# Patient Record
Sex: MALE | Race: WHITE | NOT HISPANIC OR LATINO | Employment: FULL TIME | URBAN - METROPOLITAN AREA
[De-identification: names, ages, dates, MRNs, and addresses within clinical notes are randomized per-mention and may not be internally consistent; named-entity substitution may affect disease eponyms.]

---

## 2019-08-23 ENCOUNTER — TELEPHONE (OUTPATIENT)
Dept: GASTROENTEROLOGY | Facility: CLINIC | Age: 49
End: 2019-08-23

## 2019-08-23 ENCOUNTER — PREP FOR SURGERY (OUTPATIENT)
Dept: OTHER | Facility: HOSPITAL | Age: 49
End: 2019-08-23

## 2019-08-23 DIAGNOSIS — K62.5 RECTAL BLEEDING: Primary | ICD-10-CM

## 2019-08-23 NOTE — TELEPHONE ENCOUNTER
Please schedule Dr. Abe Brooks to have a colonoscopy by me in the next two weeks to evaluate rectal bleeding. thx.

## 2019-08-28 PROBLEM — K62.5 RECTAL BLEEDING: Status: ACTIVE | Noted: 2019-08-28

## 2019-09-06 ENCOUNTER — ANESTHESIA (OUTPATIENT)
Dept: GASTROENTEROLOGY | Facility: HOSPITAL | Age: 49
End: 2019-09-06

## 2019-09-06 ENCOUNTER — ANESTHESIA EVENT (OUTPATIENT)
Dept: GASTROENTEROLOGY | Facility: HOSPITAL | Age: 49
End: 2019-09-06

## 2019-09-06 ENCOUNTER — HOSPITAL ENCOUNTER (OUTPATIENT)
Facility: HOSPITAL | Age: 49
Setting detail: HOSPITAL OUTPATIENT SURGERY
Discharge: HOME | End: 2019-09-06
Attending: INTERNAL MEDICINE | Admitting: INTERNAL MEDICINE

## 2019-09-06 VITALS
HEART RATE: 54 BPM | TEMPERATURE: 98.2 F | HEIGHT: 66 IN | BODY MASS INDEX: 22.68 KG/M2 | DIASTOLIC BLOOD PRESSURE: 69 MMHG | WEIGHT: 141.09 LBS | RESPIRATION RATE: 14 BRPM | SYSTOLIC BLOOD PRESSURE: 107 MMHG | OXYGEN SATURATION: 50 %

## 2019-09-06 DIAGNOSIS — K62.5 RECTAL BLEEDING: ICD-10-CM

## 2019-09-06 LAB
ALBUMIN SERPL-MCNC: 3.9 G/DL (ref 3.5–5.2)
ALBUMIN/GLOB SERPL: 1.4 G/DL
ALP SERPL-CCNC: 52 U/L (ref 39–117)
ALT SERPL W P-5'-P-CCNC: 10 U/L (ref 1–41)
ANION GAP SERPL CALCULATED.3IONS-SCNC: 10.9 MMOL/L (ref 5–15)
AST SERPL-CCNC: 19 U/L (ref 1–40)
BASOPHILS # BLD AUTO: 0.02 10*3/MM3 (ref 0–0.2)
BASOPHILS NFR BLD AUTO: 0.3 % (ref 0–1.5)
BILIRUB SERPL-MCNC: 0.4 MG/DL (ref 0.2–1.2)
BUN BLD-MCNC: 11 MG/DL (ref 6–20)
BUN/CREAT SERPL: 11.7 (ref 7–25)
CALCIUM SPEC-SCNC: 8.7 MG/DL (ref 8.6–10.5)
CHLORIDE SERPL-SCNC: 101 MMOL/L (ref 98–107)
CO2 SERPL-SCNC: 28.1 MMOL/L (ref 22–29)
CREAT BLD-MCNC: 0.94 MG/DL (ref 0.76–1.27)
CRP SERPL-MCNC: 0.57 MG/DL (ref 0–0.5)
DEPRECATED RDW RBC AUTO: 41.1 FL (ref 37–54)
EOSINOPHIL # BLD AUTO: 0.11 10*3/MM3 (ref 0–0.4)
EOSINOPHIL NFR BLD AUTO: 1.9 % (ref 0.3–6.2)
ERYTHROCYTE [DISTWIDTH] IN BLOOD BY AUTOMATED COUNT: 12.2 % (ref 12.3–15.4)
ERYTHROCYTE [SEDIMENTATION RATE] IN BLOOD: 15 MM/HR (ref 0–15)
GFR SERPL CREATININE-BSD FRML MDRD: 86 ML/MIN/1.73
GLOBULIN UR ELPH-MCNC: 2.7 GM/DL
GLUCOSE BLD-MCNC: 67 MG/DL (ref 65–99)
HCT VFR BLD AUTO: 37.1 % (ref 37.5–51)
HGB BLD-MCNC: 12.2 G/DL (ref 13–17.7)
IMM GRANULOCYTES # BLD AUTO: 0.02 10*3/MM3 (ref 0–0.05)
IMM GRANULOCYTES NFR BLD AUTO: 0.3 % (ref 0–0.5)
LYMPHOCYTES # BLD AUTO: 1.21 10*3/MM3 (ref 0.7–3.1)
LYMPHOCYTES NFR BLD AUTO: 20.8 % (ref 19.6–45.3)
MCH RBC QN AUTO: 30 PG (ref 26.6–33)
MCHC RBC AUTO-ENTMCNC: 32.9 G/DL (ref 31.5–35.7)
MCV RBC AUTO: 91.4 FL (ref 79–97)
MONOCYTES # BLD AUTO: 0.5 10*3/MM3 (ref 0.1–0.9)
MONOCYTES NFR BLD AUTO: 8.6 % (ref 5–12)
NEUTROPHILS # BLD AUTO: 3.95 10*3/MM3 (ref 1.7–7)
NEUTROPHILS NFR BLD AUTO: 68.1 % (ref 42.7–76)
NRBC BLD AUTO-RTO: 0 /100 WBC (ref 0–0.2)
PLATELET # BLD AUTO: 218 10*3/MM3 (ref 140–450)
PMV BLD AUTO: 9.6 FL (ref 6–12)
POTASSIUM BLD-SCNC: 4.3 MMOL/L (ref 3.5–5.2)
PROT SERPL-MCNC: 6.6 G/DL (ref 6–8.5)
RBC # BLD AUTO: 4.06 10*6/MM3 (ref 4.14–5.8)
SODIUM BLD-SCNC: 140 MMOL/L (ref 136–145)
WBC NRBC COR # BLD: 5.81 10*3/MM3 (ref 3.4–10.8)

## 2019-09-06 PROCEDURE — 86140 C-REACTIVE PROTEIN: CPT | Performed by: INTERNAL MEDICINE

## 2019-09-06 PROCEDURE — 88305 TISSUE EXAM BY PATHOLOGIST: CPT | Performed by: INTERNAL MEDICINE

## 2019-09-06 PROCEDURE — 25010000002 PROPOFOL 10 MG/ML EMULSION: Performed by: ANESTHESIOLOGY

## 2019-09-06 PROCEDURE — 85652 RBC SED RATE AUTOMATED: CPT | Performed by: INTERNAL MEDICINE

## 2019-09-06 PROCEDURE — 80053 COMPREHEN METABOLIC PANEL: CPT | Performed by: INTERNAL MEDICINE

## 2019-09-06 PROCEDURE — 85025 COMPLETE CBC W/AUTO DIFF WBC: CPT | Performed by: INTERNAL MEDICINE

## 2019-09-06 PROCEDURE — 45380 COLONOSCOPY AND BIOPSY: CPT | Performed by: INTERNAL MEDICINE

## 2019-09-06 RX ORDER — PROPOFOL 10 MG/ML
VIAL (ML) INTRAVENOUS AS NEEDED
Status: DISCONTINUED | OUTPATIENT
Start: 2019-09-06 | End: 2019-09-06 | Stop reason: SURG

## 2019-09-06 RX ORDER — SODIUM CHLORIDE 0.9 % (FLUSH) 0.9 %
10 SYRINGE (ML) INJECTION AS NEEDED
Status: DISCONTINUED | OUTPATIENT
Start: 2019-09-06 | End: 2019-09-06 | Stop reason: HOSPADM

## 2019-09-06 RX ORDER — LIDOCAINE HYDROCHLORIDE 10 MG/ML
0.5 INJECTION, SOLUTION INFILTRATION; PERINEURAL ONCE AS NEEDED
Status: DISCONTINUED | OUTPATIENT
Start: 2019-09-06 | End: 2019-09-06 | Stop reason: HOSPADM

## 2019-09-06 RX ORDER — CETIRIZINE HYDROCHLORIDE 10 MG/1
10 TABLET ORAL DAILY
COMMUNITY

## 2019-09-06 RX ORDER — PROPOFOL 10 MG/ML
VIAL (ML) INTRAVENOUS CONTINUOUS PRN
Status: DISCONTINUED | OUTPATIENT
Start: 2019-09-06 | End: 2019-09-06 | Stop reason: SURG

## 2019-09-06 RX ORDER — LIDOCAINE HYDROCHLORIDE 20 MG/ML
INJECTION, SOLUTION INFILTRATION; PERINEURAL AS NEEDED
Status: DISCONTINUED | OUTPATIENT
Start: 2019-09-06 | End: 2019-09-06 | Stop reason: SURG

## 2019-09-06 RX ORDER — SODIUM CHLORIDE, SODIUM LACTATE, POTASSIUM CHLORIDE, CALCIUM CHLORIDE 600; 310; 30; 20 MG/100ML; MG/100ML; MG/100ML; MG/100ML
1000 INJECTION, SOLUTION INTRAVENOUS CONTINUOUS
Status: DISCONTINUED | OUTPATIENT
Start: 2019-09-06 | End: 2019-09-06 | Stop reason: HOSPADM

## 2019-09-06 RX ADMIN — SODIUM CHLORIDE, POTASSIUM CHLORIDE, SODIUM LACTATE AND CALCIUM CHLORIDE 1000 ML: 600; 310; 30; 20 INJECTION, SOLUTION INTRAVENOUS at 14:28

## 2019-09-06 RX ADMIN — PROPOFOL 100 MG: 10 INJECTION, EMULSION INTRAVENOUS at 14:45

## 2019-09-06 RX ADMIN — PROPOFOL 100 MG: 10 INJECTION, EMULSION INTRAVENOUS at 14:35

## 2019-09-06 RX ADMIN — PROPOFOL 128 MCG/KG/MIN: 10 INJECTION, EMULSION INTRAVENOUS at 14:35

## 2019-09-06 RX ADMIN — LIDOCAINE HYDROCHLORIDE 50 MG: 20 INJECTION, SOLUTION INFILTRATION; PERINEURAL at 14:35

## 2019-09-06 NOTE — ANESTHESIA PREPROCEDURE EVALUATION
Anesthesia Evaluation     Patient summary reviewed and Nursing notes reviewed   NPO Solid Status: > 8 hours  NPO Liquid Status: > 4 hours           Airway   Mallampati: II  TM distance: >3 FB  Neck ROM: full  No difficulty expected  Dental - normal exam     Pulmonary - normal exam   Cardiovascular - normal exam        Neuro/Psych  GI/Hepatic/Renal/Endo    (+)  GI bleeding (rectal bleeding),     Musculoskeletal     Abdominal  - normal exam    Bowel sounds: normal.   Substance History      OB/GYN          Other                      Anesthesia Plan    ASA 1     MAC     Anesthetic plan, all risks, benefits, and alternatives have been provided, discussed and informed consent has been obtained with: patient.

## 2019-09-06 NOTE — H&P
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         Past Medical History:   Past Medical History:   Diagnosis Date   • Seasonal allergies        Family History:  History reviewed. No pertinent family history.    Social History:   reports that he has never smoked. He has never used smokeless tobacco. He reports that he drinks alcohol. He reports that he does not use drugs.    Medications:   Medications Prior to Admission   Medication Sig Dispense Refill Last Dose   • cetirizine (zyrTEC) 10 MG tablet Take 10 mg by mouth Daily.   9/5/2019 at Unknown time       Allergies:  Sulfa antibiotics    ROS:    Pertinent items are noted in HPI     Objective     Blood pressure 123/72, pulse 63, temperature 98.6 °F (37 °C), temperature source Oral, resp. rate 14, height 167.6 cm (66\"), weight 64 kg (141 lb 1.5 oz), SpO2 98 %.    Physical Exam   Constitutional: Pt is oriented to person, place, and time and well-developed, well-nourished, and in no distress.   HENT:   Mouth/Throat: Oropharynx is clear and moist.   Neck: Normal range of motion. Neck supple.   Cardiovascular: Normal rate, regular rhythm and normal heart sounds.    Pulmonary/Chest: Effort normal and breath sounds normal. No respiratory distress. No  wheezes.   Abdominal: Soft. Bowel sounds are normal.   Skin: Skin is warm and dry.   Psychiatric: Mood, memory, affect and judgment normal.     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     Anticipated Surgical Procedure:  Colonoscopy    The risks, benefits, and alternatives of this procedure have been discussed with the patient or the responsible party- the patient understands and agrees to proceed.                                                                  "

## 2019-09-06 NOTE — ANESTHESIA POSTPROCEDURE EVALUATION
"Patient: Mark Brooks    Procedure Summary     Date:  09/06/19 Room / Location:  [facility] ENDOSCOPY 6 /  [facility]    Anesthesia Start:  1434 Anesthesia Stop:  1511    Procedure:  COLONOSCOPY TO CECUM AND TI with biopsies (N/A ) Diagnosis:       Rectal bleeding      (Rectal bleeding [K62.5])    Surgeon:  Edgardo Friedman MD Provider:  Kristina Allan MD    Anesthesia Type:  MAC ASA Status:  1          Anesthesia Type: MAC  Last vitals  BP   (!) 89/50 (09/06/19 1512)   Temp   37 °C (98.6 °F) (09/06/19 1408)   Pulse   52 (09/06/19 1512)   Resp   14 (09/06/19 1408)     SpO2   97 % (09/06/19 1512)     Post Anesthesia Care and Evaluation    Patient location during evaluation: PACU  Patient participation: complete - patient participated  Level of consciousness: sleepy but conscious  Pain score: 0  Pain management: adequate  Airway patency: patent  Anesthetic complications: No anesthetic complications    Cardiovascular status: acceptable  Respiratory status: acceptable  Hydration status: acceptable    Comments: Blood pressure (!) 89/50, pulse 52, temperature 37 °C (98.6 °F), temperature source Oral, resp. rate 14, height 167.6 cm (66\"), weight 64 kg (141 lb 1.5 oz), SpO2 97 %.    No anesthesia care post op    "

## 2019-09-09 LAB
CYTO UR: NORMAL
LAB AP CASE REPORT: NORMAL
LAB AP DIAGNOSIS COMMENT: NORMAL
PATH REPORT.FINAL DX SPEC: NORMAL
PATH REPORT.GROSS SPEC: NORMAL

## 2019-09-11 ENCOUNTER — TELEPHONE (OUTPATIENT)
Dept: GASTROENTEROLOGY | Facility: CLINIC | Age: 49
End: 2019-09-11

## 2019-09-11 DIAGNOSIS — K52.9 COLITIS: ICD-10-CM

## 2019-09-11 DIAGNOSIS — R19.7 DIARRHEA, UNSPECIFIED TYPE: Primary | ICD-10-CM

## 2019-09-11 NOTE — TELEPHONE ENCOUNTER
----- Message from Dorothy Siegel RN sent at 9/11/2019 11:47 AM EDT -----  Regarding: FW: Path results  Contact: [phone]  I think this is Dr. Friedman's patient, if not send it back to me. thanks  ----- Message -----  From: Jaycee Le  Sent: 9/11/2019  10:02 AM  To: Katharina Gastro [facility] Clinical 2 Pool  Subject: Path results                                     Pt is calling for the resuls

## 2019-09-12 ENCOUNTER — TELEPHONE (OUTPATIENT)
Dept: GASTROENTEROLOGY | Facility: CLINIC | Age: 49
End: 2019-09-12

## 2019-09-12 ENCOUNTER — RESULTS ENCOUNTER (OUTPATIENT)
Dept: GASTROENTEROLOGY | Facility: CLINIC | Age: 49
End: 2019-09-12

## 2019-09-12 DIAGNOSIS — R19.7 DIARRHEA, UNSPECIFIED TYPE: ICD-10-CM

## 2019-09-12 DIAGNOSIS — K52.9 COLITIS: ICD-10-CM

## 2019-09-12 NOTE — TELEPHONE ENCOUNTER
----- Message from Edgardo Friedman MD sent at 9/11/2019  8:51 PM EDT -----  09/11/19  I called him with lab results and path results from his colonoscopy. We will do stool studies next. His mom has crohn's diagnosed in her late 30's. This may be crohn's colitis but will r/o infectious colitis first. FAX to his PCP.  Gordonx. kjh

## 2019-09-12 NOTE — TELEPHONE ENCOUNTER
9/11/19 - I called him with results of labs and path from his colonoscopy. I would like to have stool studies done to r/o infectious colitis. He will come by the office to  stool study containers for the following stool studies:  - clostridium dificile EIA,  - stool culture,  - O and P  - Giardia antigen  - fecal calprotectin  (I have already ordered the above stool studies).    SA/MT - Dr. Brooks will come by the office in the next few days to  the above stool study containers. Please leave directions and anything else that he will need to collect these stool studies. This will evaluate his diarrhea and colitis found on colonoscopy. thx.kjh

## 2019-09-12 NOTE — PROGRESS NOTES
09/11/19  I called him with lab results and path results from his colonoscopy. We will do stool studies next. His mom has crohn's diagnosed in her late 30's. This may be crohn's colitis but will r/o infectious colitis first. FAX to his PCP.  Jarrell glover

## 2019-09-18 LAB — C DIFF TOX A+B STL QL IA: NEGATIVE

## 2019-09-19 LAB — CALPROTECTIN STL-MCNT: 554 UG/G (ref 0–120)

## 2019-09-21 LAB

## 2019-09-22 ENCOUNTER — TELEPHONE (OUTPATIENT)
Dept: GASTROENTEROLOGY | Facility: CLINIC | Age: 49
End: 2019-09-22

## 2019-09-22 DIAGNOSIS — K50.919 CROHN'S DISEASE WITH COMPLICATION, UNSPECIFIED GASTROINTESTINAL TRACT LOCATION (HCC): Primary | ICD-10-CM

## 2019-09-22 RX ORDER — MESALAMINE 0.38 G/1
CAPSULE, EXTENDED RELEASE ORAL
Qty: 120 CAPSULE | Refills: 11 | Status: SHIPPED | OUTPATIENT
Start: 2019-09-22 | End: 2023-01-04

## 2019-09-22 NOTE — TELEPHONE ENCOUNTER
Called Dr. Brooks and reviewed the results of his stool studies.  I also called in a prescription for Apriso 2 pills daily.  He will call me in 2 weeks to tell me how he is doing.  If he still has the diarrhea then I would increase the Apriso to four tabs daily.  I want him to follow-up with me in the office in 6 weeks and I will see how he is doing and will check lab work at that time. belen

## 2019-09-22 NOTE — PROGRESS NOTES
09/22/19  Please call Dr. Abe Brooks and tell him that his stool studies came back normal, which is good.  I recommend that we try him on 1 of the mesalamine products to see if they work to help with the diarrhea given that he could have Crohn's colitis?  If he is okay with that then please send in a prescription for 1 of the mesalamine products such as Apriso.  He could take Apriso 2 pills once a day. Have him call me in two weeks after he has been on the Apriso for two weeks. I would want to see how he is tolerated the Apriso and is it working?  Thx. kjh

## 2019-09-23 ENCOUNTER — TELEPHONE (OUTPATIENT)
Dept: GASTROENTEROLOGY | Facility: CLINIC | Age: 49
End: 2019-09-23

## 2019-09-23 NOTE — TELEPHONE ENCOUNTER
----- Message from Edgardo Friedman MD sent at 9/22/2019  3:19 PM EDT -----  09/22/19  Please call Dr. Abe Brooks and tell him that his stool studies came back normal, which is good.  I recommend that we try him on 1 of the mesalamine products to see if they work to help with the diarrhea given that he could have Crohn's colitis?  If he is okay with that then please send in a prescription for 1 of the mesalamine products such as Apriso.  He could take Apriso 2 pills once a day. Have him call me in two weeks after he has been on the Apriso for two weeks. I would want to see how he is tolerated the Apriso and is it working?  Thx. kjh

## 2019-11-13 ENCOUNTER — OFFICE VISIT (OUTPATIENT)
Dept: GASTROENTEROLOGY | Facility: CLINIC | Age: 49
End: 2019-11-13

## 2019-11-13 VITALS
DIASTOLIC BLOOD PRESSURE: 72 MMHG | BODY MASS INDEX: 22.76 KG/M2 | HEIGHT: 66 IN | WEIGHT: 141.6 LBS | TEMPERATURE: 98.2 F | SYSTOLIC BLOOD PRESSURE: 104 MMHG

## 2019-11-13 DIAGNOSIS — K50.118 CROHN'S DISEASE OF LARGE INTESTINE WITH OTHER COMPLICATION (HCC): ICD-10-CM

## 2019-11-13 DIAGNOSIS — D64.9 ANEMIA, UNSPECIFIED TYPE: Primary | ICD-10-CM

## 2019-11-13 PROCEDURE — 99213 OFFICE O/P EST LOW 20 MIN: CPT | Performed by: INTERNAL MEDICINE

## 2019-11-13 NOTE — PROGRESS NOTES
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  His mom has Crohn's disease and is on sulfasalazine..  I last did a colonoscopy on him 9/6/2019 because of rectal bleeding and occasional diarrhea.  I did find an anal fissure as well as internal hemorrhoids.  Throughout the colon with areas of erosions and occasional ulcers.  I put the patient on Apriso 2 pills daily. He is still having loose stool. He has one BM every 2-3 days. One episode of rectal bleeding in the last 2 mos. He has occasional anal fullness but no anal pain. No abdominal pain. NO chest pain. No nausea or vomting. NO fevers, chills. He has no extraintestinal manifestation of crohn's. Weight stable. Energy level good.     Past Medical History:   Diagnosis Date   • Seasonal allergies        Past Surgical History:   Procedure Laterality Date   • COLONOSCOPY N/A 9/6/2019    Anal fissure, IH, Intermittent areas of colonic erosions/ulcers with some normal looking intervening areas mucosa    • HAND OPEN REDUCTION INTERNAL FIXATION Right          Current Outpatient Medications:   •  mesalamine (APRISO) 0.375 g 24 hr capsule, Take 4 po daily (Patient taking differently: Take 375 mg by mouth 2 (Two) Times a Day. Take 4 po daily), Disp: 120 capsule, Rfl: 11  •  cetirizine (zyrTEC) 10 MG tablet, Take 10 mg by mouth Daily., Disp: , Rfl:     Allergies   Allergen Reactions   • Sulfa Antibiotics Rash       Family History   Problem Relation Age of Onset   • Crohn's disease Mother        Social History     Socioeconomic History   • Marital status:      Spouse name: Not on file   • Number of children: Not on file   • Years of education: Not on file   • Highest education level: Not on file   Tobacco Use   • Smoking status: Never Smoker   • Smokeless tobacco: Never Used   Substance and Sexual Activity   • Alcohol use: Yes     Comment: occasional   •  Drug use: No   • Sexual activity: Defer       Review of Systems   All other systems reviewed and are negative.      Vitals:    11/13/19 1714   BP: 104/72   Temp: 98.2 °F (36.8 °C)       Physical Exam   Constitutional: He is oriented to person, place, and time. He appears well-developed and well-nourished. No distress.   HENT:   Head: Normocephalic and atraumatic. Hair is normal.   Right Ear: Hearing, tympanic membrane, external ear and ear canal normal.   Left Ear: Hearing, tympanic membrane, external ear and ear canal normal.   Nose: No sinus tenderness or nasal deformity.   Mouth/Throat: Uvula is midline, oropharynx is clear and moist and mucous membranes are normal. No oral lesions. No uvula swelling.   Eyes: Conjunctivae, EOM and lids are normal. Pupils are equal, round, and reactive to light. Right eye exhibits no discharge. Left eye exhibits no discharge. No scleral icterus. Right eye exhibits normal extraocular motion and no nystagmus. Left eye exhibits normal extraocular motion and no nystagmus.   Neck: Normal range of motion. Neck supple. No JVD present. No thyromegaly present.   Cardiovascular: Normal rate, regular rhythm, normal heart sounds, intact distal pulses and normal pulses. Exam reveals no gallop.   No murmur heard.  Pulmonary/Chest: Effort normal and breath sounds normal. No respiratory distress. He has no wheezes. He has no rales. He exhibits no tenderness.   Abdominal: Soft. Bowel sounds are normal. He exhibits no distension and no mass. There is no tenderness. There is no guarding. No hernia.   Musculoskeletal: Normal range of motion. He exhibits no edema, tenderness or deformity.   Lymphadenopathy:     He has no cervical adenopathy.   Neurological: He is alert and oriented to person, place, and time. He has normal reflexes. He displays normal reflexes. No cranial nerve deficit. He exhibits normal muscle tone. Coordination normal.   Skin: Skin is warm and dry. No rash noted. He is not  diaphoretic.   Psychiatric: He has a normal mood and affect. His behavior is normal. Judgment and thought content normal.   Vitals reviewed.      Mark was seen today for follow-up and crohn's disease.    Diagnoses and all orders for this visit:    Anemia, unspecified type  -     CBC & Differential; Future  -     Comprehensive Metabolic Panel; Future  -     Celiac Ab tTG DGP TIgA; Future  -     TSH; Future  -     Ferritin; Future  -     Folate RBC; Future  -     Iron Profile; Future  -     Vitamin B12; Future    Crohn's disease of large intestine with other complication (CMS/HCC)  -     CBC & Differential; Future  -     Comprehensive Metabolic Panel; Future  -     Celiac Ab tTG DGP TIgA; Future  -     TSH; Future  -     Ferritin; Future  -     Folate RBC; Future  -     Iron Profile; Future  -     Vitamin B12; Future      Assessment:  1. Anemia  2) crohn's colitis (?)    Recommendations:  1. Take Apriso 4/day  2) Labs  3. F/u 6 weeks.     Return in about 6 weeks (around 12/25/2019).    Edgardo Friedman MD  11/13/2019

## 2019-11-14 ENCOUNTER — RESULTS ENCOUNTER (OUTPATIENT)
Dept: GASTROENTEROLOGY | Facility: CLINIC | Age: 49
End: 2019-11-14

## 2019-11-14 DIAGNOSIS — K50.118 CROHN'S DISEASE OF LARGE INTESTINE WITH OTHER COMPLICATION (HCC): ICD-10-CM

## 2019-11-14 DIAGNOSIS — D64.9 ANEMIA, UNSPECIFIED TYPE: ICD-10-CM

## 2019-11-16 LAB
ALBUMIN SERPL-MCNC: 4.9 G/DL (ref 3.5–5.5)
ALBUMIN/GLOB SERPL: 1.7 {RATIO} (ref 1.2–2.2)
ALP SERPL-CCNC: 67 IU/L (ref 39–117)
ALT SERPL-CCNC: 14 IU/L (ref 0–44)
AST SERPL-CCNC: 25 IU/L (ref 0–40)
BASOPHILS # BLD AUTO: 0 X10E3/UL (ref 0–0.2)
BASOPHILS NFR BLD AUTO: 0 %
BILIRUB SERPL-MCNC: 0.4 MG/DL (ref 0–1.2)
BUN SERPL-MCNC: 15 MG/DL (ref 6–24)
BUN/CREAT SERPL: 13 (ref 9–20)
CALCIUM SERPL-MCNC: 9.9 MG/DL (ref 8.7–10.2)
CHLORIDE SERPL-SCNC: 103 MMOL/L (ref 96–106)
CO2 SERPL-SCNC: 23 MMOL/L (ref 20–29)
CREAT SERPL-MCNC: 1.14 MG/DL (ref 0.76–1.27)
EOSINOPHIL # BLD AUTO: 0.1 X10E3/UL (ref 0–0.4)
EOSINOPHIL NFR BLD AUTO: 2 %
ERYTHROCYTE [DISTWIDTH] IN BLOOD BY AUTOMATED COUNT: 13.5 % (ref 12.3–15.4)
FERRITIN SERPL-MCNC: 153 NG/ML (ref 30–400)
GLIADIN PEPTIDE IGA SER-ACNC: 15 UNITS (ref 0–19)
GLIADIN PEPTIDE IGG SER-ACNC: 6 UNITS (ref 0–19)
GLOBULIN SER CALC-MCNC: 2.9 G/DL (ref 1.5–4.5)
GLUCOSE SERPL-MCNC: 95 MG/DL (ref 65–99)
HCT VFR BLD AUTO: 44.9 % (ref 37.5–51)
HGB BLD-MCNC: 14.7 G/DL (ref 13–17.7)
IGA SERPL-MCNC: 331 MG/DL (ref 90–386)
IMM GRANULOCYTES # BLD AUTO: 0 X10E3/UL (ref 0–0.1)
IMM GRANULOCYTES NFR BLD AUTO: 0 %
IRON SATN MFR SERPL: 27 % (ref 15–55)
IRON SERPL-MCNC: 96 UG/DL (ref 38–169)
LYMPHOCYTES # BLD AUTO: 1 X10E3/UL (ref 0.7–3.1)
LYMPHOCYTES NFR BLD AUTO: 17 %
MCH RBC QN AUTO: 30.2 PG (ref 26.6–33)
MCHC RBC AUTO-ENTMCNC: 32.7 G/DL (ref 31.5–35.7)
MCV RBC AUTO: 92 FL (ref 79–97)
MONOCYTES # BLD AUTO: 0.5 X10E3/UL (ref 0.1–0.9)
MONOCYTES NFR BLD AUTO: 8 %
NEUTROPHILS # BLD AUTO: 4.3 X10E3/UL (ref 1.4–7)
NEUTROPHILS NFR BLD AUTO: 73 %
PLATELET # BLD AUTO: 267 X10E3/UL (ref 150–450)
POTASSIUM SERPL-SCNC: 5 MMOL/L (ref 3.5–5.2)
PROT SERPL-MCNC: 7.8 G/DL (ref 6–8.5)
RBC # BLD AUTO: 4.87 X10E6/UL (ref 4.14–5.8)
SODIUM SERPL-SCNC: 142 MMOL/L (ref 134–144)
TIBC SERPL-MCNC: 360 UG/DL (ref 250–450)
TSH SERPL DL<=0.005 MIU/L-ACNC: 3.06 UIU/ML (ref 0.45–4.5)
TTG IGA SER-ACNC: <2 U/ML (ref 0–3)
TTG IGG SER-ACNC: <2 U/ML (ref 0–5)
UIBC SERPL-MCNC: 264 UG/DL (ref 111–343)
VIT B12 SERPL-MCNC: 476 PG/ML (ref 232–1245)
WBC # BLD AUTO: 5.9 X10E3/UL (ref 3.4–10.8)

## 2019-11-16 NOTE — PROGRESS NOTES
11/16/19  Abe - Your labs look normal. I hope you are doing well.     MT/ - please FAX these lab results to his PCP.  Jarrell glover

## 2019-11-17 LAB
FOLATE BLD-MCNC: 353 NG/ML
FOLATE RBC-MCNC: 786 NG/ML

## 2019-11-26 ENCOUNTER — TELEPHONE (OUTPATIENT)
Dept: GASTROENTEROLOGY | Facility: CLINIC | Age: 49
End: 2019-11-26

## 2019-11-26 NOTE — TELEPHONE ENCOUNTER
----- Message from Edgardo Friedman MD sent at 11/16/2019  1:13 PM EST -----  11/16/19  Abe - Your labs look normal. I hope you are doing well.     MT/ - please FAX these lab results to his PCP.  Jarrell glover

## 2020-01-02 ENCOUNTER — OFFICE VISIT (OUTPATIENT)
Dept: GASTROENTEROLOGY | Facility: CLINIC | Age: 50
End: 2020-01-02

## 2020-01-02 VITALS
BODY MASS INDEX: 23.5 KG/M2 | WEIGHT: 146.2 LBS | SYSTOLIC BLOOD PRESSURE: 120 MMHG | HEIGHT: 66 IN | DIASTOLIC BLOOD PRESSURE: 68 MMHG | TEMPERATURE: 97.9 F

## 2020-01-02 DIAGNOSIS — K50.118 CROHN'S DISEASE OF LARGE INTESTINE WITH OTHER COMPLICATION (HCC): Primary | ICD-10-CM

## 2020-01-02 DIAGNOSIS — R19.7 DIARRHEA, UNSPECIFIED TYPE: ICD-10-CM

## 2020-01-02 PROCEDURE — 99213 OFFICE O/P EST LOW 20 MIN: CPT | Performed by: INTERNAL MEDICINE

## 2020-01-02 NOTE — PROGRESS NOTES
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  His mom has Crohn's disease and is on sulfasalazine.  I did a colonoscopy on him in 9 of 2019 because of rectal bleeding and occasional diarrhea.  I did find an anal fissure as well as internal hemorrhoids.  Throughout the colon were areas of erosions and occasional ulcers.  I put the patient on Apriso 4 pills daily. Doesn't notice any change from 2-4 Apriso/day. He averages one BM/day and it is loose. No rectal bleeding. NO anal discomfort. No abdominal or chest pain. No nausea or vomting. No fevers, chills. Weight stable. No extraintestinal manifestations of crohn's.     Past Medical History:   Diagnosis Date   • Seasonal allergies        Past Surgical History:   Procedure Laterality Date   • COLONOSCOPY N/A 9/6/2019    Anal fissure, IH, Intermittent areas of colonic erosions/ulcers with some normal looking intervening areas mucosa    • HAND OPEN REDUCTION INTERNAL FIXATION Right          Current Outpatient Medications:   •  cetirizine (zyrTEC) 10 MG tablet, Take 10 mg by mouth Daily., Disp: , Rfl:   •  mesalamine (APRISO) 0.375 g 24 hr capsule, Take 4 po daily (Patient taking differently: Take 375 mg by mouth 2 (Two) Times a Day. Take 4 po daily), Disp: 120 capsule, Rfl: 11    Allergies   Allergen Reactions   • Sulfa Antibiotics Rash       Family History   Problem Relation Age of Onset   • Crohn's disease Mother        Social History     Socioeconomic History   • Marital status:      Spouse name: Not on file   • Number of children: Not on file   • Years of education: Not on file   • Highest education level: Not on file   Tobacco Use   • Smoking status: Never Smoker   • Smokeless tobacco: Never Used   Substance and Sexual Activity   • Alcohol use: Yes     Comment: occasional   • Drug use: No   • Sexual activity: Defer       Review of Systems    Gastrointestinal: Negative for abdominal distention and abdominal pain.   All other systems reviewed and are negative.      Vitals:    01/02/20 0806   BP: 120/68   Temp: 97.9 °F (36.6 °C)       Physical Exam   Constitutional: He is oriented to person, place, and time. He appears well-developed and well-nourished. No distress.   HENT:   Head: Normocephalic and atraumatic. Hair is normal.   Right Ear: Hearing, tympanic membrane, external ear and ear canal normal.   Left Ear: Hearing, tympanic membrane, external ear and ear canal normal.   Nose: No sinus tenderness or nasal deformity.   Mouth/Throat: Uvula is midline, oropharynx is clear and moist and mucous membranes are normal. No oral lesions. No uvula swelling.   Eyes: Pupils are equal, round, and reactive to light. Conjunctivae, EOM and lids are normal. Right eye exhibits no discharge. Left eye exhibits no discharge. No scleral icterus. Right eye exhibits normal extraocular motion and no nystagmus. Left eye exhibits normal extraocular motion and no nystagmus.   Neck: Normal range of motion. Neck supple. No JVD present. No thyromegaly present.   Cardiovascular: Normal rate, regular rhythm, normal heart sounds, intact distal pulses and normal pulses. Exam reveals no gallop.   No murmur heard.  Pulmonary/Chest: Effort normal and breath sounds normal. No respiratory distress. He has no wheezes. He has no rales. He exhibits no tenderness.   Abdominal: Soft. Bowel sounds are normal. He exhibits no distension and no mass. There is no tenderness. There is no guarding. No hernia.   Musculoskeletal: Normal range of motion. He exhibits no edema, tenderness or deformity.   Lymphadenopathy:     He has no cervical adenopathy.   Neurological: He is alert and oriented to person, place, and time. He has normal reflexes. He displays normal reflexes. No cranial nerve deficit. He exhibits normal muscle tone. Coordination normal.   Skin: Skin is warm and dry. No rash noted. He is  not diaphoretic.   Psychiatric: He has a normal mood and affect. His behavior is normal. Judgment and thought content normal.   Vitals reviewed.      Mark was seen today for anemia and crohn's disease.    Diagnoses and all orders for this visit:    Crohn's disease of large intestine with other complication (CMS/HCC)    Diarrhea, unspecified type      Assessment:  1.  Possible Crohn's colitis.  His mom has Crohn's disease and is on sulfasalazine.  2.    Recommendations:  1. Try stopping Apriso and see how he does.   2. F/u 3mos    Return in about 3 months (around 4/2/2020).    Edgardo Friedman MD  1/2/2020

## 2020-04-09 ENCOUNTER — TELEPHONE (OUTPATIENT)
Dept: GASTROENTEROLOGY | Facility: CLINIC | Age: 50
End: 2020-04-09

## 2020-04-09 ENCOUNTER — TELEMEDICINE (OUTPATIENT)
Dept: GASTROENTEROLOGY | Facility: CLINIC | Age: 50
End: 2020-04-09

## 2020-04-09 DIAGNOSIS — Z83.79 FH: CROHN'S DISEASE: Primary | ICD-10-CM

## 2020-04-09 DIAGNOSIS — K52.9 COLITIS: ICD-10-CM

## 2020-04-09 PROCEDURE — 99213 OFFICE O/P EST LOW 20 MIN: CPT | Performed by: INTERNAL MEDICINE

## 2020-04-09 NOTE — PROGRESS NOTES
No chief complaint on file.      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 We stopped the Apriso in 1/20 and he is the same. In the last 3 mos he had rectal bleeding once. He has loose bowels, < 1 BM/day. No abdominal pain or urgency. No abdominal or chest pain.  Overall he feels good. NO nausea or vomiting. NO fevers, chills. Weight stable. He can do anything he wants. Exercising regularly. NO mouth sores. No arthritis. No skin rashes. No eye c/o.     Video Visit: 15 minutes.    Past Medical History:   Diagnosis Date   • Seasonal allergies        Past Surgical History:   Procedure Laterality Date   • COLONOSCOPY N/A 9/6/2019    Anal fissure, IH, Intermittent areas of colonic erosions/ulcers with some normal looking intervening areas mucosa    • HAND OPEN REDUCTION INTERNAL FIXATION Right          Current Outpatient Medications:   •  cetirizine (zyrTEC) 10 MG tablet, Take 10 mg by mouth Daily., Disp: , Rfl:   •  mesalamine (APRISO) 0.375 g 24 hr capsule, Take 4 po daily (Patient taking differently: Take 375 mg by mouth 2 (Two) Times a Day. Take 4 po daily), Disp: 120 capsule, Rfl: 11    Allergies   Allergen Reactions   • Sulfa Antibiotics Rash       Family History   Problem Relation Age of Onset   • Crohn's disease Mother        Social History     Socioeconomic History   • Marital status:      Spouse name: Not on file   • Number of children: Not on file   • Years of education: Not on file   • Highest education level: Not on file   Tobacco Use   • Smoking status: Never Smoker   • Smokeless tobacco: Never Used   Substance and Sexual Activity   • Alcohol use: Yes     Comment: occasional   • Drug use: No   • Sexual activity: Defer       Review of Systems   Gastrointestinal: Negative for abdominal distention, abdominal pain, constipation, diarrhea, nausea, rectal pain and vomiting.     Pertinent positives and negatives documented in the HPI and all other systems reviewed  and were found to be negative.  There were no vitals filed for this visit.    Physical Exam   Constitutional: He is oriented to person, place, and time. He appears well-developed and well-nourished.   He looks good. Well nourished. Strong voice. A and O x 3.   HENT:   Head: Normocephalic and atraumatic.   Right Ear: External ear normal.   Left Ear: External ear normal.   Neurological: He is alert and oriented to person, place, and time.   Skin: Skin is dry.   Psychiatric: He has a normal mood and affect. His behavior is normal. Judgment and thought content normal.       Diagnoses and all orders for this visit:    : Crohn's disease    Colitis      Assessment:  1. H/o colonic ulcers/erosions. His mom has crohn's. He was no better with or without Apriso and has very little complaints now. If he has crohn's colitis it is very mild. I think that the risk of putting him on something like prednisone or Humira far outweighs the benefits. Should we repeat a colonoscopy in 9/20 (one year after the last colonoscopy?). We could but I don't think that I would treat him given that he has very few c/o. I say we leave him alone. He will get labs from Dr. NICKIE Mcdonald (PCP) in a few mos.   2.  (mom) crohn's    Recommendations:  1. F/u one year. At that time we could discuss repeating a colonsocopy. I want him to call me if he has any GI c/o or other questions.   2. I will ask Dr. NICKIE Mcdonald to fax results of his labs so I can keep up with how he is doing.     No follow-ups on file.    Edgardo Friedman MD  4/9/2020

## 2020-04-13 ENCOUNTER — TRANSCRIBE ORDERS (OUTPATIENT)
Dept: ADMINISTRATIVE | Facility: HOSPITAL | Age: 50
End: 2020-04-13

## 2020-04-13 ENCOUNTER — HOSPITAL ENCOUNTER (OUTPATIENT)
Dept: GENERAL RADIOLOGY | Facility: HOSPITAL | Age: 50
Discharge: HOME | End: 2020-04-13
Admitting: INTERNAL MEDICINE

## 2020-04-13 DIAGNOSIS — R52 PAIN: Primary | ICD-10-CM

## 2020-04-13 DIAGNOSIS — R52 PAIN: ICD-10-CM

## 2020-04-13 PROCEDURE — 72110 X-RAY EXAM L-2 SPINE 4/>VWS: CPT

## 2020-12-21 ENCOUNTER — IMMUNIZATION (OUTPATIENT)
Dept: VACCINE CLINIC | Facility: HOSPITAL | Age: 50
End: 2020-12-21

## 2020-12-21 PROCEDURE — 91300 HC SARSCOV02 VAC 30MCG/0.3ML IM: CPT | Performed by: INTERNAL MEDICINE

## 2020-12-21 PROCEDURE — 0001A: CPT | Performed by: INTERNAL MEDICINE

## 2021-01-11 ENCOUNTER — IMMUNIZATION (OUTPATIENT)
Dept: VACCINE CLINIC | Facility: HOSPITAL | Age: 51
End: 2021-01-11

## 2021-01-11 PROCEDURE — 91300 HC SARSCOV02 VAC 30MCG/0.3ML IM: CPT | Performed by: INTERNAL MEDICINE

## 2021-01-11 PROCEDURE — 0001A: CPT | Performed by: INTERNAL MEDICINE

## 2021-01-11 PROCEDURE — 0002A: CPT | Performed by: INTERNAL MEDICINE

## 2021-02-05 ENCOUNTER — TELEMEDICINE (OUTPATIENT)
Dept: FAMILY MEDICINE CLINIC | Facility: TELEHEALTH | Age: 51
End: 2021-02-05

## 2021-02-05 DIAGNOSIS — Z76.89 RETURN TO WORK EVALUATION: Primary | ICD-10-CM

## 2021-02-05 PROBLEM — R05.8 DRY COUGH: Status: ACTIVE | Noted: 2021-02-05

## 2021-02-05 PROCEDURE — BHEMPVIDEOVISIT: Performed by: NURSE PRACTITIONER

## 2021-02-05 NOTE — PROGRESS NOTES
"CHIEF COMPLAINT  Chief Complaint   Patient presents with   • Letter for School/Work         HPI  Mark Brooks is a 50 y.o. male who needs a return to work evaluation. He started having mild cough on Tuesday. He was evaluated yesterday and had negative COVID-19 test. His symptoms have \"markedly improved\" and he ran today without coughing or shortness of breath.   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 He is not scheduled to go back into work until next Tuesday.     Review of Systems   Constitutional: Negative for chills, diaphoresis, fatigue and fever.   HENT: Negative for congestion, rhinorrhea and sore throat.    Respiratory: Positive for cough (mild and resolving ). Negative for shortness of breath and wheezing.    Cardiovascular: Negative for chest pain.   Gastrointestinal: Negative for diarrhea, nausea and vomiting.   Musculoskeletal: Negative for myalgias.   Neurological: Negative for headaches.   Hematological: Negative for adenopathy.       Past Medical History:   Diagnosis Date   • Seasonal allergies        Family History   Problem Relation Age of Onset   • Crohn's disease Mother        Social History     Socioeconomic History   • Marital status:      Spouse name: Not on file   • Number of children: Not on file   • Years of education: Not on file   • Highest education level: Not on file   Tobacco Use   • Smoking status: Never Smoker   • Smokeless tobacco: Never Used   Substance and Sexual Activity   • Alcohol use: Yes     Comment: occasional   • Drug use: No   • Sexual activity: Defer         There were no vitals taken for this visit.    PHYSICAL EXAM  Physical Exam   Constitutional: He is oriented to person, place, and time. He appears well-developed and well-nourished. He has a sickly appearance. He does not appear ill. No distress.   HENT:   Head: Normocephalic and atraumatic.   Neurological: He is alert and oriented to person, place, and time. "   Psychiatric: He has a normal mood and affect.       Results for orders placed or performed during the hospital encounter of 02/04/21   COVID-19,[facility] ROUTINE, NP/OP SWAB IN TRANSPORT MEDIA OR ESWAB 72 HR TAT - Swab, Anterior nasal    Specimen: Anterior nasal; Swab   Result Value Ref Range    SARS-CoV-2, ALYCE Not Detected Not Detected       Diagnoses and all orders for this visit:    1. Return to work evaluation (Primary)    Symptoms markedly improved in just a few days. Able to exercise without cough or shortness of breath. I feel since symptoms were mild and with symptoms resolution he is safe to go back to work.   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 He is not scheduled to go back into work until next Tuesday.     FOLLOW-UP  As discussed during visit with PCP/[facility] if no improvement or Urgent Care/Emergency Department if worsening of symptoms    Patient verbalizes understanding of medication dosage, comfort measures, instructions for treatment and follow-up.    Federica Beltran, DEIDRE  02/05/2021  17:35 EST    This visit was performed via Telehealth.  This patient has been instructed to follow-up with their primary care provider if their symptoms worsen or the treatment provided does not resolve their illness.

## 2021-08-25 ENCOUNTER — OFFICE VISIT (OUTPATIENT)
Dept: GASTROENTEROLOGY | Facility: CLINIC | Age: 51
End: 2021-08-25

## 2021-08-25 VITALS
TEMPERATURE: 96.9 F | WEIGHT: 142 LBS | HEIGHT: 66 IN | DIASTOLIC BLOOD PRESSURE: 66 MMHG | BODY MASS INDEX: 22.82 KG/M2 | SYSTOLIC BLOOD PRESSURE: 122 MMHG

## 2021-08-25 DIAGNOSIS — R19.7 DIARRHEA, UNSPECIFIED TYPE: ICD-10-CM

## 2021-08-25 DIAGNOSIS — Z83.79 FH: CROHN'S DISEASE: Primary | ICD-10-CM

## 2021-08-25 PROCEDURE — 99213 OFFICE O/P EST LOW 20 MIN: CPT | Performed by: INTERNAL MEDICINE

## 2021-08-25 RX ORDER — OMEPRAZOLE 20 MG/1
20 CAPSULE, DELAYED RELEASE ORAL 2 TIMES DAILY
COMMUNITY

## 2021-08-25 NOTE — PROGRESS NOTES
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 I last saw him in 4/20 and my assessment and plans were:  Assessment:  1. H/o colonic ulcers/erosions. His mom has crohn's. He was no better with or without Apriso and has very little complaints now. If he has crohn's colitis it is very mild. I think that the risk of putting him on something like prednisone or Humira far outweighs the benefits. Should we repeat a colonoscopy in 9/20 (one year after the last colonoscopy?). We could but I don't think that I would treat him given that he has very few c/o. I say we leave him alone. He will get labs from Dr. NICKIE Mcdonald (PCP) in a few mos.   2.  (mom) crohn's     Recommendations:  1. F/u one year. At that time we could discuss repeating a colonsocopy. I want him to call me if he has any GI c/o or other questions.   2. I will ask Dr. NICKIE Mcdonald to fax results of his labs so I can keep up with how he is doing.        He last had a colonoscopy in 9 of 2019.  The results were:  Assessment:  - Anal fissure found on perianal exam.  - The examined portion of the ileum was normal.  - Internal hemorrhoids.  - Intermittent areas of colonic erosions/ulcers with some normal looking intervening areas mucosa. Could this be  infectious, crohn's colitis, medication induced, vs other?       After reviewing pathology I had said:  09/11/19  I called him with lab results and path results from his colonoscopy. We will do stool studies next. His mom has crohn's diagnosed in her late 30's. This may be crohn's colitis but will r/o infectious colitis first. FAX to his PCP.       He feels fine. NO abdominal or chest pain. No nausea or vomiting. He has periodic loose stool. Rare rectal bleeding 6 mos ago. No melena. Weight stable. No mouth sores. No eye c/o. No skin rashes. No arthritis. His mom's crohn's is under control.     Past Medical  History:   Diagnosis Date   • Anal fissure    • Crohn's disease (CMS/HCC)    • Family history of colon cancer    • GERD (gastroesophageal reflux disease)    • Internal hemorrhoids    • Seasonal allergies        Past Surgical History:   Procedure Laterality Date   • COLONOSCOPY N/A 9/6/2019    Anal fissure, IH, Intermittent areas of colonic erosions/ulcers with some normal looking intervening areas mucosa    • HAND OPEN REDUCTION INTERNAL FIXATION Right    • WISDOM TOOTH EXTRACTION           Current Outpatient Medications:   •  cetirizine (zyrTEC) 10 MG tablet, Take 10 mg by mouth Daily., Disp: , Rfl:   •  mesalamine (APRISO) 0.375 g 24 hr capsule, Take 4 po daily (Patient taking differently: Take 375 mg by mouth 2 (Two) Times a Day. Take 4 po daily), Disp: 120 capsule, Rfl: 11  •  omeprazole (priLOSEC) 20 MG capsule, Take 20 mg by mouth 2 (two) times a day., Disp: , Rfl:     Allergies   Allergen Reactions   • Sulfa Antibiotics Rash       Family History   Problem Relation Age of Onset   • Crohn's disease Mother    • Colon cancer Maternal Uncle        Social History     Socioeconomic History   • Marital status:      Spouse name: Not on file   • Number of children: Not on file   • Years of education: Not on file   • Highest education level: Not on file   Tobacco Use   • Smoking status: Never Smoker   • Smokeless tobacco: Never Used   Substance and Sexual Activity   • Alcohol use: Yes     Comment: social   • Drug use: No   • Sexual activity: Defer       Review of Systems   Gastrointestinal: Positive for diarrhea. Negative for abdominal pain.   All other systems reviewed and are negative.    Pertinent positives and negatives documented in the HPI and all other systems reviewed and were found to be negative.  Vitals:    08/25/21 1627   BP: 122/66   Temp: 96.9 °F (36.1 °C)       Physical Exam  Vitals reviewed.   Constitutional:       General: He is not in acute distress.     Appearance: Normal appearance. He is  well-developed. He is not diaphoretic.   HENT:      Head: Normocephalic and atraumatic. Hair is normal.      Right Ear: Hearing, tympanic membrane, ear canal and external ear normal.      Left Ear: Hearing, tympanic membrane, ear canal and external ear normal.      Nose: Nose normal. No nasal deformity.      Mouth/Throat:      Mouth: Mucous membranes are moist. No oral lesions.      Pharynx: Uvula midline. No uvula swelling.   Eyes:      General: Lids are normal. No scleral icterus.        Right eye: No discharge.         Left eye: No discharge.      Extraocular Movements: Extraocular movements intact.      Right eye: Normal extraocular motion and no nystagmus.      Left eye: Normal extraocular motion and no nystagmus.      Conjunctiva/sclera: Conjunctivae normal.      Pupils: Pupils are equal, round, and reactive to light.   Neck:      Thyroid: No thyromegaly.      Vascular: No JVD.   Cardiovascular:      Rate and Rhythm: Normal rate and regular rhythm.      Pulses: Normal pulses.      Heart sounds: Normal heart sounds. No murmur heard.   No gallop.    Pulmonary:      Effort: Pulmonary effort is normal. No respiratory distress.      Breath sounds: Normal breath sounds. No wheezing or rales.   Chest:      Chest wall: No tenderness.   Abdominal:      General: Bowel sounds are normal. There is no distension.      Palpations: Abdomen is soft. There is no mass.      Tenderness: There is no abdominal tenderness. There is no guarding.      Hernia: No hernia is present.   Genitourinary:     Rectum: Normal. Guaiac result negative.   Musculoskeletal:         General: No tenderness or deformity. Normal range of motion.      Cervical back: Normal range of motion and neck supple.   Lymphadenopathy:      Cervical: No cervical adenopathy.   Skin:     General: Skin is warm and dry.      Findings: No rash.   Neurological:      Mental Status: He is alert and oriented to person, place, and time.      Cranial Nerves: No cranial nerve  deficit.      Motor: No abnormal muscle tone.      Coordination: Coordination normal.      Deep Tendon Reflexes: Reflexes are normal and symmetric. Reflexes normal.   Psychiatric:         Mood and Affect: Mood normal.         Behavior: Behavior normal.         Thought Content: Thought content normal.         Judgment: Judgment normal.         Diagnoses and all orders for this visit:    1. FH: Crohn's disease (Primary)    2. Diarrhea, unspecified type      Assessment:  1. Intermittent loose bowels.  2. FH (mom) crohn's    Recommendations:  1. Get lab results from Dr. NICKIE Mcdonald.  2. We talked about the pros and cons of doing another colonoscopy. If it were the same I don't think that I would treat him now.  3. F/u 1 yrs and sooner if troubles.   4. I want him to think about getting a CT abd/pelvis with small bowel enterrography.    Return in about 1 year (around 8/25/2022).    Edgardo Friedman MD  8/25/2021

## 2021-08-26 ENCOUNTER — TELEPHONE (OUTPATIENT)
Dept: GASTROENTEROLOGY | Facility: CLINIC | Age: 51
End: 2021-08-26

## 2021-08-26 NOTE — TELEPHONE ENCOUNTER
----- Message from Edgardo Friedman MD sent at 8/25/2021  6:07 PM EDT -----  Get most recent labs from Dr. Saeed Mcdonald.

## 2021-10-08 ENCOUNTER — IMMUNIZATION (OUTPATIENT)
Dept: VACCINE CLINIC | Facility: HOSPITAL | Age: 51
End: 2021-10-08

## 2021-10-08 PROCEDURE — 0004A ADM SARSCOV2 30MCG/0.3ML BOOSTER: CPT | Performed by: INTERNAL MEDICINE

## 2021-10-08 PROCEDURE — 0003A: CPT | Performed by: INTERNAL MEDICINE

## 2021-10-08 PROCEDURE — 91300 HC SARSCOV02 VAC 30MCG/0.3ML IM: CPT | Performed by: INTERNAL MEDICINE

## 2023-01-04 ENCOUNTER — OFFICE VISIT (OUTPATIENT)
Dept: GASTROENTEROLOGY | Facility: CLINIC | Age: 53
End: 2023-01-04
Payer: COMMERCIAL

## 2023-01-04 ENCOUNTER — TELEPHONE (OUTPATIENT)
Dept: GASTROENTEROLOGY | Facility: CLINIC | Age: 53
End: 2023-01-04
Payer: COMMERCIAL

## 2023-01-04 VITALS
HEIGHT: 67 IN | TEMPERATURE: 97.3 F | BODY MASS INDEX: 22.54 KG/M2 | OXYGEN SATURATION: 98 % | HEART RATE: 46 BPM | DIASTOLIC BLOOD PRESSURE: 75 MMHG | WEIGHT: 143.6 LBS | SYSTOLIC BLOOD PRESSURE: 111 MMHG

## 2023-01-04 DIAGNOSIS — Z83.79 FH: CROHN'S DISEASE: Primary | ICD-10-CM

## 2023-01-04 DIAGNOSIS — R10.30 LOWER ABDOMINAL PAIN: ICD-10-CM

## 2023-01-04 DIAGNOSIS — K21.00 GASTROESOPHAGEAL REFLUX DISEASE WITH ESOPHAGITIS WITHOUT HEMORRHAGE: ICD-10-CM

## 2023-01-04 DIAGNOSIS — R19.7 DIARRHEA, UNSPECIFIED TYPE: ICD-10-CM

## 2023-01-04 LAB
ALBUMIN SERPL-MCNC: 4.7 G/DL (ref 3.5–5.2)
ALBUMIN/GLOB SERPL: 1.8 G/DL
ALP SERPL-CCNC: 70 U/L (ref 39–117)
ALT SERPL-CCNC: 16 U/L (ref 1–41)
AST SERPL-CCNC: 21 U/L (ref 1–40)
BASOPHILS # BLD AUTO: 0.02 10*3/MM3 (ref 0–0.2)
BASOPHILS NFR BLD AUTO: 0.4 % (ref 0–1.5)
BILIRUB SERPL-MCNC: 0.3 MG/DL (ref 0–1.2)
BUN SERPL-MCNC: 14 MG/DL (ref 6–20)
BUN/CREAT SERPL: 16.1 (ref 7–25)
CALCIUM SERPL-MCNC: 9.3 MG/DL (ref 8.6–10.5)
CHLORIDE SERPL-SCNC: 102 MMOL/L (ref 98–107)
CO2 SERPL-SCNC: 27.5 MMOL/L (ref 22–29)
CREAT SERPL-MCNC: 0.87 MG/DL (ref 0.76–1.27)
EGFRCR SERPLBLD CKD-EPI 2021: 104.5 ML/MIN/1.73
EOSINOPHIL # BLD AUTO: 0.07 10*3/MM3 (ref 0–0.4)
EOSINOPHIL NFR BLD AUTO: 1.5 % (ref 0.3–6.2)
ERYTHROCYTE [DISTWIDTH] IN BLOOD BY AUTOMATED COUNT: 12.9 % (ref 12.3–15.4)
GLOBULIN SER CALC-MCNC: 2.6 GM/DL
GLUCOSE SERPL-MCNC: 92 MG/DL (ref 65–99)
HCT VFR BLD AUTO: 42.6 % (ref 37.5–51)
HGB BLD-MCNC: 14 G/DL (ref 13–17.7)
IMM GRANULOCYTES # BLD AUTO: 0.01 10*3/MM3 (ref 0–0.05)
IMM GRANULOCYTES NFR BLD AUTO: 0.2 % (ref 0–0.5)
LYMPHOCYTES # BLD AUTO: 1.26 10*3/MM3 (ref 0.7–3.1)
LYMPHOCYTES NFR BLD AUTO: 26.2 % (ref 19.6–45.3)
MCH RBC QN AUTO: 30.6 PG (ref 26.6–33)
MCHC RBC AUTO-ENTMCNC: 32.9 G/DL (ref 31.5–35.7)
MCV RBC AUTO: 93 FL (ref 79–97)
MONOCYTES # BLD AUTO: 0.39 10*3/MM3 (ref 0.1–0.9)
MONOCYTES NFR BLD AUTO: 8.1 % (ref 5–12)
NEUTROPHILS # BLD AUTO: 3.06 10*3/MM3 (ref 1.7–7)
NEUTROPHILS NFR BLD AUTO: 63.6 % (ref 42.7–76)
NRBC BLD AUTO-RTO: 0 /100 WBC (ref 0–0.2)
PLATELET # BLD AUTO: 208 10*3/MM3 (ref 140–450)
POTASSIUM SERPL-SCNC: 4.7 MMOL/L (ref 3.5–5.2)
PROT SERPL-MCNC: 7.3 G/DL (ref 6–8.5)
RBC # BLD AUTO: 4.58 10*6/MM3 (ref 4.14–5.8)
SODIUM SERPL-SCNC: 139 MMOL/L (ref 136–145)
TSH SERPL DL<=0.005 MIU/L-ACNC: 1.67 UIU/ML (ref 0.27–4.2)
WBC # BLD AUTO: 4.81 10*3/MM3 (ref 3.4–10.8)

## 2023-01-04 PROCEDURE — 99214 OFFICE O/P EST MOD 30 MIN: CPT | Performed by: INTERNAL MEDICINE

## 2023-01-04 NOTE — PROGRESS NOTES
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 I last saw him in 8/21 and my assessment and plans were:  Assessment:  1. Intermittent loose bowels.  2. FH (mom) crohn's     Recommendations:  1. Get lab results from Dr. NICKIE Mcdonald.  2. We talked about the pros and cons of doing another colonoscopy. If it were the same I don't think that I would treat him now.  3. F/u 1 yrs and sooner if troubles.   4. I want him to think about getting a CT abd/pelvis with small bowel enterrography.      I last did a colonoscopy in 9/19:  Assessment:  - Anal fissure found on perianal exam.  - The examined portion of the ileum was normal.  - Internal hemorrhoids.  - Intermittent areas of colonic erosions/ulcers with some normal looking intervening areas mucosa. Could this be  infectious, crohn's colitis, medication induced, vs other?       Over the last year he notices times of looser stools, urgency, lower abdominal discomfort. In the last 5-10 yrs he may awaken (3-5 times/yr) with feeling that he needs to have a BM and have rectal pain. . He would sit on toilet and nothing would happen. No rectal bleeding or melena. He usually has 0-1 BM/day. No nausea or vomiting. NO fevers, chills, night sweats. Weight stable. 2 kids: 17 yo girl and boy. On omeprazole for intermittent  Coughing that Dr. Mcdonald thinks is GERD. This does not keep him from work and he went to [place] x 12 days with his son. No extraintestinal manifestations of IBD.     Past Medical History:   Diagnosis Date   • Anal fissure    • Crohn's disease (HCC)    • Family history of colon cancer    • GERD (gastroesophageal reflux disease)    • Internal hemorrhoids    • Seasonal allergies        Past Surgical History:   Procedure Laterality Date   • COLONOSCOPY N/A 09/06/2019    Anal fissure, IH, Intermittent areas of colonic erosions/ulcers with some normal looking  intervening areas mucosa    • HAND OPEN REDUCTION INTERNAL FIXATION Right    • UPPER GASTROINTESTINAL ENDOSCOPY  2019   • WISDOM TOOTH EXTRACTION           Current Outpatient Medications:   •  cetirizine (zyrTEC) 10 MG tablet, Take 10 mg by mouth Daily., Disp: , Rfl:   •  omeprazole (priLOSEC) 20 MG capsule, Take 20 mg by mouth 2 (two) times a day., Disp: , Rfl:   •  mesalamine (APRISO) 0.375 g 24 hr capsule, Take 4 po daily (Patient taking differently: Take 375 mg by mouth 2 (Two) Times a Day. Take 4 po daily), Disp: 120 capsule, Rfl: 11    Allergies   Allergen Reactions   • Sulfa Antibiotics Rash       Family History   Problem Relation Age of Onset   • Crohn's disease Mother    • Colon cancer Maternal Uncle        Social History     Socioeconomic History   • Marital status:    Tobacco Use   • Smoking status: Never   • Smokeless tobacco: Never   Substance and Sexual Activity   • Alcohol use: Yes     Comment: social   • Drug use: No   • Sexual activity: Defer       Review of Systems   Gastrointestinal: Positive for abdominal pain and diarrhea. Negative for anal bleeding and constipation.   All other systems reviewed and are negative.    Pertinent positives and negatives documented in the HPI and all other systems reviewed and were found to be negative.  Vitals:    01/04/23 0817   BP: 111/75   Pulse: (!) 46   Temp: 97.3 °F (36.3 °C)   SpO2: 98%       Physical Exam  Vitals reviewed.   Constitutional:       General: He is not in acute distress.     Appearance: Normal appearance. He is well-developed. He is not diaphoretic.   HENT:      Head: Normocephalic and atraumatic. Hair is normal.      Right Ear: Hearing, tympanic membrane, ear canal and external ear normal.      Left Ear: Hearing, tympanic membrane, ear canal and external ear normal.      Nose: Nose normal. No nasal deformity.      Mouth/Throat:      Mouth: Mucous membranes are moist. No oral lesions.      Pharynx: Uvula midline. No uvula swelling.    Eyes:      General: Lids are normal. No scleral icterus.        Right eye: No discharge.         Left eye: No discharge.      Extraocular Movements: Extraocular movements intact.      Right eye: Normal extraocular motion and no nystagmus.      Left eye: Normal extraocular motion and no nystagmus.      Conjunctiva/sclera: Conjunctivae normal.      Pupils: Pupils are equal, round, and reactive to light.   Neck:      Thyroid: No thyromegaly.      Vascular: No JVD.   Cardiovascular:      Rate and Rhythm: Normal rate and regular rhythm.      Pulses: Normal pulses.      Heart sounds: Normal heart sounds. No murmur heard.    No gallop.   Pulmonary:      Effort: Pulmonary effort is normal. No respiratory distress.      Breath sounds: Normal breath sounds. No wheezing or rales.   Chest:      Chest wall: No tenderness.   Abdominal:      General: Bowel sounds are normal. There is no distension.      Palpations: Abdomen is soft. There is no mass.      Tenderness: There is no abdominal tenderness. There is no guarding.      Hernia: No hernia is present.   Genitourinary:     Rectum: Normal. Guaiac result negative.   Musculoskeletal:         General: No tenderness or deformity. Normal range of motion.      Cervical back: Normal range of motion and neck supple.   Lymphadenopathy:      Cervical: No cervical adenopathy.   Skin:     General: Skin is warm and dry.      Findings: No rash.   Neurological:      Mental Status: He is alert and oriented to person, place, and time.      Cranial Nerves: No cranial nerve deficit.      Motor: No abnormal muscle tone.      Coordination: Coordination normal.      Deep Tendon Reflexes: Reflexes are normal and symmetric. Reflexes normal.   Psychiatric:         Mood and Affect: Mood normal.         Behavior: Behavior normal.         Thought Content: Thought content normal.         Judgment: Judgment normal.         Diagnoses and all orders for this visit:    1. FH: Crohn's disease (Primary)  -      CBC & Differential  -     Comprehensive Metabolic Panel  -     Celiac Ab tTG DGP TIgA  -     TSH  -     CT Enterography Abdomen Pelvis w wo Contrast; Future  -     Case Request; Standing  -     Case Request    2. Diarrhea, unspecified type  -     CBC & Differential  -     Comprehensive Metabolic Panel  -     Celiac Ab tTG DGP TIgA  -     TSH  -     CT Enterography Abdomen Pelvis w wo Contrast; Future  -     Case Request; Standing  -     Case Request    3. Lower abdominal pain  -     CBC & Differential  -     Comprehensive Metabolic Panel  -     Celiac Ab tTG DGP TIgA  -     TSH  -     CT Enterography Abdomen Pelvis w wo Contrast; Future  -     Case Request; Standing  -     Case Request    4. Gastroesophageal reflux disease with esophagitis without hemorrhage  -     Case Request; Standing  -     Case Request    Other orders  -     Follow Anesthesia Guidelines / Protocol; Future  -     Obtain Informed Consent; Future  -     Implement Anesthesia orders day of procedure.; Standing  -     Obtain informed consent; Standing  -     Verify bowel prep was successful; Standing  -     Give tap water enema if bowel prep was insufficient; Standing      Assessment:  1. FH (mom) crohn's  2.  Episodes of loose stool.  3. Rectal pain. Could he have intermittent anal fissures?  4. Lower abdominal pain.  5.     Recommendations:  1. Labs -   2. CT abd/pelvis with small bowel enterography.  3. Colonoscopy + EGD  4.     No follow-ups on file.    Edgardo Friedman MD  1/4/2023

## 2023-01-04 NOTE — TELEPHONE ENCOUNTER
OK FOR HUB TO READ ASHLEY patient via telephone for EGD/COLONOSCOPY. Scheduled 4/14/23 with arrival time of 0800AM. Prep paperwork mailed to verified address on file. Patient advised arrival time may change based on [facility] guidelines. ASHLEY GONZALEZ

## 2023-01-05 LAB
GLIADIN PEPTIDE IGA SER-ACNC: 12 UNITS (ref 0–19)
GLIADIN PEPTIDE IGG SER-ACNC: 4 UNITS (ref 0–19)
IGA SERPL-MCNC: 286 MG/DL (ref 90–386)
TTG IGA SER-ACNC: <2 U/ML (ref 0–3)
TTG IGG SER-ACNC: 4 U/ML (ref 0–5)

## 2023-01-17 NOTE — PROGRESS NOTES
01/16/23      Tell Dr. Brooks that his labs look good.        We will see what the CT abd/pelvis with small bowel enteroscopy, EGD and colonsocopy show?  Thx. kjh

## 2023-01-30 ENCOUNTER — HOSPITAL ENCOUNTER (OUTPATIENT)
Dept: CT IMAGING | Facility: HOSPITAL | Age: 53
Discharge: HOME | End: 2023-01-30
Admitting: INTERNAL MEDICINE
Payer: COMMERCIAL

## 2023-01-30 DIAGNOSIS — R19.7 DIARRHEA, UNSPECIFIED TYPE: ICD-10-CM

## 2023-01-30 DIAGNOSIS — Z83.79 FH: CROHN'S DISEASE: ICD-10-CM

## 2023-01-30 DIAGNOSIS — R10.30 LOWER ABDOMINAL PAIN: ICD-10-CM

## 2023-01-30 PROCEDURE — 25010000002 IOPAMIDOL 61 % SOLUTION: Performed by: INTERNAL MEDICINE

## 2023-01-30 PROCEDURE — 74178 CT ABD&PLV WO CNTR FLWD CNTR: CPT

## 2023-01-30 RX ADMIN — IOPAMIDOL 95 ML: 612 INJECTION, SOLUTION INTRAVENOUS at 11:09

## 2023-02-06 NOTE — PROGRESS NOTES
02/06/23       I called him and discussed the results of the CT of the abdomen and pelvis with small bowel enterography with the patient.  Please send a copy of this report to his PCP.  Jarrell glover

## 2023-02-16 ENCOUNTER — TELEPHONE (OUTPATIENT)
Dept: GASTROENTEROLOGY | Facility: CLINIC | Age: 53
End: 2023-02-16
Payer: COMMERCIAL

## 2023-02-16 NOTE — TELEPHONE ENCOUNTER
----- Message from Edgardo Friedman MD sent at 2/6/2023  4:33 PM EST -----  02/06/23       I called him and discussed the results of the CT of the abdomen and pelvis with small bowel enterography with the patient.  Please send a copy of this report to his PCP.  Thx. kjh

## 2023-04-14 ENCOUNTER — ANESTHESIA EVENT (OUTPATIENT)
Dept: GASTROENTEROLOGY | Facility: HOSPITAL | Age: 53
End: 2023-04-14
Payer: COMMERCIAL

## 2023-04-14 ENCOUNTER — HOSPITAL ENCOUNTER (OUTPATIENT)
Facility: HOSPITAL | Age: 53
Setting detail: HOSPITAL OUTPATIENT SURGERY
Discharge: HOME | End: 2023-04-14
Attending: INTERNAL MEDICINE | Admitting: INTERNAL MEDICINE
Payer: COMMERCIAL

## 2023-04-14 ENCOUNTER — ANESTHESIA (OUTPATIENT)
Dept: GASTROENTEROLOGY | Facility: HOSPITAL | Age: 53
End: 2023-04-14
Payer: COMMERCIAL

## 2023-04-14 VITALS
BODY MASS INDEX: 21.86 KG/M2 | DIASTOLIC BLOOD PRESSURE: 64 MMHG | WEIGHT: 136 LBS | RESPIRATION RATE: 16 BRPM | HEART RATE: 40 BPM | SYSTOLIC BLOOD PRESSURE: 103 MMHG | OXYGEN SATURATION: 97 % | HEIGHT: 66 IN

## 2023-04-14 DIAGNOSIS — K21.00 GASTROESOPHAGEAL REFLUX DISEASE WITH ESOPHAGITIS WITHOUT HEMORRHAGE: ICD-10-CM

## 2023-04-14 DIAGNOSIS — R10.30 LOWER ABDOMINAL PAIN: ICD-10-CM

## 2023-04-14 DIAGNOSIS — R19.7 DIARRHEA, UNSPECIFIED TYPE: ICD-10-CM

## 2023-04-14 DIAGNOSIS — Z83.79 FH: CROHN'S DISEASE: ICD-10-CM

## 2023-04-14 PROCEDURE — 43239 EGD BIOPSY SINGLE/MULTIPLE: CPT | Performed by: INTERNAL MEDICINE

## 2023-04-14 PROCEDURE — 88305 TISSUE EXAM BY PATHOLOGIST: CPT | Performed by: INTERNAL MEDICINE

## 2023-04-14 PROCEDURE — 45380 COLONOSCOPY AND BIOPSY: CPT | Performed by: INTERNAL MEDICINE

## 2023-04-14 PROCEDURE — 25010000002 PROPOFOL 10 MG/ML EMULSION: Performed by: ANESTHESIOLOGY

## 2023-04-14 RX ORDER — SODIUM CHLORIDE 9 MG/ML
40 INJECTION, SOLUTION INTRAVENOUS AS NEEDED
Status: DISCONTINUED | OUTPATIENT
Start: 2023-04-14 | End: 2023-04-14 | Stop reason: HOSPADM

## 2023-04-14 RX ORDER — SODIUM CHLORIDE, SODIUM LACTATE, POTASSIUM CHLORIDE, CALCIUM CHLORIDE 600; 310; 30; 20 MG/100ML; MG/100ML; MG/100ML; MG/100ML
30 INJECTION, SOLUTION INTRAVENOUS CONTINUOUS PRN
Status: DISCONTINUED | OUTPATIENT
Start: 2023-04-14 | End: 2023-04-14 | Stop reason: HOSPADM

## 2023-04-14 RX ORDER — SODIUM CHLORIDE 0.9 % (FLUSH) 0.9 %
10 SYRINGE (ML) INJECTION AS NEEDED
Status: DISCONTINUED | OUTPATIENT
Start: 2023-04-14 | End: 2023-04-14 | Stop reason: HOSPADM

## 2023-04-14 RX ORDER — PROPOFOL 10 MG/ML
VIAL (ML) INTRAVENOUS CONTINUOUS PRN
Status: DISCONTINUED | OUTPATIENT
Start: 2023-04-14 | End: 2023-04-14 | Stop reason: SURG

## 2023-04-14 RX ORDER — LIDOCAINE HYDROCHLORIDE 20 MG/ML
INJECTION, SOLUTION INFILTRATION; PERINEURAL AS NEEDED
Status: DISCONTINUED | OUTPATIENT
Start: 2023-04-14 | End: 2023-04-14 | Stop reason: SURG

## 2023-04-14 RX ADMIN — PROPOFOL 80 MG: 10 INJECTION, EMULSION INTRAVENOUS at 08:51

## 2023-04-14 RX ADMIN — LIDOCAINE HYDROCHLORIDE 60 MG: 20 INJECTION, SOLUTION INFILTRATION; PERINEURAL at 08:56

## 2023-04-14 RX ADMIN — PROPOFOL 100 MCG/KG/MIN: 10 INJECTION, EMULSION INTRAVENOUS at 08:52

## 2023-04-14 RX ADMIN — SODIUM CHLORIDE, POTASSIUM CHLORIDE, SODIUM LACTATE AND CALCIUM CHLORIDE 30 ML/HR: 600; 310; 30; 20 INJECTION, SOLUTION INTRAVENOUS at 08:42

## 2023-04-14 NOTE — ANESTHESIA PREPROCEDURE EVALUATION
Anesthesia Evaluation                  Airway   Mallampati: I  TM distance: >3 FB  Neck ROM: full  No difficulty expected  Dental - normal exam     Pulmonary - normal exam   Cardiovascular - normal exam        Neuro/Psych  GI/Hepatic/Renal/Endo    (+)  GERD, GI bleeding ,     Musculoskeletal     Abdominal  - normal exam    Bowel sounds: normal.   Substance History      OB/GYN          Other                        Anesthesia Plan    ASA 2     MAC     intravenous induction     Anesthetic plan, risks, benefits, and alternatives have been provided, discussed and informed consent has been obtained with: patient.        CODE STATUS:

## 2023-04-14 NOTE — H&P
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 I last saw him in 8/21 and my assessment and plans were:  Assessment:  1. Intermittent loose bowels.  2. FH (mom) crohn's     Recommendations:  1. Get lab results from Dr. NICKIE Mcdonald.  2. We talked about the pros and cons of doing another colonoscopy. If it were the same I don't think that I would treat him now.  3. F/u 1 yrs and sooner if troubles.   4. I want him to think about getting a CT abd/pelvis with small bowel enterrography.      I last did a colonoscopy in 9/19:  Assessment:  - Anal fissure found on perianal exam.  - The examined portion of the ileum was normal.  - Internal hemorrhoids.  - Intermittent areas of colonic erosions/ulcers with some normal looking intervening areas mucosa. Could this be  infectious, crohn's colitis, medication induced, vs other?       Over the last year he notices times of looser stools, urgency, lower abdominal discomfort. In the last 5-10 yrs he may awaken (3-5 times/yr) with feeling that he needs to have a BM and have rectal pain. . He would sit on toilet and nothing would happen. No rectal bleeding or melena. He usually has 0-1 BM/day. No nausea or vomiting. NO fevers, chills, night sweats. Weight stable. 2 kids: 15 yo girl and boy. On omeprazole for intermittent  Coughing that Dr. Mcdonald thinks is GERD. This does not keep him from work and he went to [place] x 12 days with his son. No extraintestinal manifestations of IBD.      Medical History        Past Medical History:   Diagnosis Date   • Anal fissure     • Crohn's disease (HCC)     • Family history of colon cancer     • GERD (gastroesophageal reflux disease)     • Internal hemorrhoids     • Seasonal allergies              Surgical History         Past Surgical History:   Procedure Laterality Date   • COLONOSCOPY N/A 09/06/2019     Anal fissure, IH, Intermittent  areas of colonic erosions/ulcers with some normal looking intervening areas mucosa    • HAND OPEN REDUCTION INTERNAL FIXATION Right     • UPPER GASTROINTESTINAL ENDOSCOPY   2019   • WISDOM TOOTH EXTRACTION                   Current Outpatient Medications:   •  cetirizine (zyrTEC) 10 MG tablet, Take 10 mg by mouth Daily., Disp: , Rfl:   •  omeprazole (priLOSEC) 20 MG capsule, Take 20 mg by mouth 2 (two) times a day., Disp: , Rfl:   •  mesalamine (APRISO) 0.375 g 24 hr capsule, Take 4 po daily (Patient taking differently: Take 375 mg by mouth 2 (Two) Times a Day. Take 4 po daily), Disp: 120 capsule, Rfl: 11          Allergies   Allergen Reactions   • Sulfa Antibiotics Rash               Family History   Problem Relation Age of Onset   • Crohn's disease Mother     • Colon cancer Maternal Uncle           Social History   Social History            Socioeconomic History   • Marital status:    Tobacco Use   • Smoking status: Never   • Smokeless tobacco: Never   Substance and Sexual Activity   • Alcohol use: Yes       Comment: social   • Drug use: No   • Sexual activity: Defer            Review of Systems   Gastrointestinal: Positive for abdominal pain and diarrhea. Negative for anal bleeding and constipation.   All other systems reviewed and are negative.     Pertinent positives and negatives documented in the HPI and all other systems reviewed and were found to be negative.      Vitals:     01/04/23 0817   BP: 111/75   Pulse: (!) 46   Temp: 97.3 °F (36.3 °C)   SpO2: 98%         Physical Exam  Vitals reviewed.   Constitutional:       General: He is not in acute distress.     Appearance: Normal appearance. He is well-developed. He is not diaphoretic.   HENT:      Head: Normocephalic and atraumatic. Hair is normal.      Right Ear: Hearing, tympanic membrane, ear canal and external ear normal.      Left Ear: Hearing, tympanic membrane, ear canal and external ear normal.      Nose: Nose normal. No nasal deformity.       Mouth/Throat:      Mouth: Mucous membranes are moist. No oral lesions.      Pharynx: Uvula midline. No uvula swelling.   Eyes:      General: Lids are normal. No scleral icterus.        Right eye: No discharge.         Left eye: No discharge.      Extraocular Movements: Extraocular movements intact.      Right eye: Normal extraocular motion and no nystagmus.      Left eye: Normal extraocular motion and no nystagmus.      Conjunctiva/sclera: Conjunctivae normal.      Pupils: Pupils are equal, round, and reactive to light.   Neck:      Thyroid: No thyromegaly.      Vascular: No JVD.   Cardiovascular:      Rate and Rhythm: Normal rate and regular rhythm.      Pulses: Normal pulses.      Heart sounds: Normal heart sounds. No murmur heard.    No gallop.   Pulmonary:      Effort: Pulmonary effort is normal. No respiratory distress.      Breath sounds: Normal breath sounds. No wheezing or rales.   Chest:      Chest wall: No tenderness.   Abdominal:      General: Bowel sounds are normal. There is no distension.      Palpations: Abdomen is soft. There is no mass.      Tenderness: There is no abdominal tenderness. There is no guarding.      Hernia: No hernia is present.   Genitourinary:     Rectum: Normal. Guaiac result negative.   Musculoskeletal:         General: No tenderness or deformity. Normal range of motion.      Cervical back: Normal range of motion and neck supple.   Lymphadenopathy:      Cervical: No cervical adenopathy.   Skin:     General: Skin is warm and dry.      Findings: No rash.   Neurological:      Mental Status: He is alert and oriented to person, place, and time.      Cranial Nerves: No cranial nerve deficit.      Motor: No abnormal muscle tone.      Coordination: Coordination normal.      Deep Tendon Reflexes: Reflexes are normal and symmetric. Reflexes normal.   Psychiatric:         Mood and Affect: Mood normal.         Behavior: Behavior normal.         Thought Content: Thought content normal.          Judgment: Judgment normal.            Diagnoses and all orders for this visit:     1. FH: Crohn's disease (Primary)  -     CBC & Differential  -     Comprehensive Metabolic Panel  -     Celiac Ab tTG DGP TIgA  -     TSH  -     CT Enterography Abdomen Pelvis w wo Contrast; Future  -     Case Request; Standing  -     Case Request     2. Diarrhea, unspecified type  -     CBC & Differential  -     Comprehensive Metabolic Panel  -     Celiac Ab tTG DGP TIgA  -     TSH  -     CT Enterography Abdomen Pelvis w wo Contrast; Future  -     Case Request; Standing  -     Case Request     3. Lower abdominal pain  -     CBC & Differential  -     Comprehensive Metabolic Panel  -     Celiac Ab tTG DGP TIgA  -     TSH  -     CT Enterography Abdomen Pelvis w wo Contrast; Future  -     Case Request; Standing  -     Case Request     4. Gastroesophageal reflux disease with esophagitis without hemorrhage  -     Case Request; Standing  -     Case Request     Other orders  -     Follow Anesthesia Guidelines / Protocol; Future  -     Obtain Informed Consent; Future  -     Implement Anesthesia orders day of procedure.; Standing  -     Obtain informed consent; Standing  -     Verify bowel prep was successful; Standing  -     Give tap water enema if bowel prep was insufficient; Standing        Assessment:  1. FH (mom) crohn's  2.  Episodes of loose stool.  3. Rectal pain. Could he have intermittent anal fissures?  4. Lower abdominal pain.  5.      Recommendations:  1. Labs -   2. CT abd/pelvis with small bowel enterography.  3. Colonoscopy + EGD  4.      No follow-ups on file.     4/14/23 - No change from the above H and P.   Edgardo Friedman MD

## 2023-04-14 NOTE — ANESTHESIA POSTPROCEDURE EVALUATION
Patient: Mark Brooks    Procedure Summary     Date: 04/14/23 Room / Location: [facility] ENDOSCOPY 6 /  [facility]    Anesthesia Start: 0847 Anesthesia Stop: 0930    Procedures:       ESOPHAGOGASTRODUODENOSCOPY WITH COLD BIOPSIES (Esophagus)      COLONOSCOPY INTO CECUM & TERMINAL ILEUM WITH COLD BIOPSIES AND COLD BIOPSY POLYPECTOMY Diagnosis:       FH: Crohn's disease      Diarrhea, unspecified type      Lower abdominal pain      Gastroesophageal reflux disease with esophagitis without hemorrhage      (FH: Crohn's disease [Z83.79])      (Diarrhea, unspecified type [R19.7])      (Lower abdominal pain [R10.30])      (Gastroesophageal reflux disease with esophagitis without hemorrhage [K21.00])    Surgeons: Edgardo Friedman MD Provider: Trudy Barrera MD    Anesthesia Type: MAC ASA Status: 2          Anesthesia Type: MAC    Vitals  Vitals Value Taken Time   BP 84/49 04/14/23 0929   Temp     Pulse 48 04/14/23 0929   Resp 16 04/14/23 0929   SpO2 97 % 04/14/23 0929           Post Anesthesia Care and Evaluation    Patient location during evaluation: bedside  Patient participation: complete - patient participated  Level of consciousness: awake  Pain management: adequate    Airway patency: patent  Anesthetic complications: No anesthetic complications    Cardiovascular status: acceptable  Respiratory status: acceptable  Hydration status: acceptable

## 2023-04-17 LAB
LAB AP CASE REPORT: NORMAL
LAB AP DIAGNOSIS COMMENT: NORMAL
PATH REPORT.FINAL DX SPEC: NORMAL
PATH REPORT.GROSS SPEC: NORMAL

## 2023-04-18 NOTE — PROGRESS NOTES
04/18/23       I discussed pathology with this patient.  The gastric erosion/ulcer that was found pathology was nonspecific.  I recommend that he avoid nonsteroidal anti-inflammatory drugs and take Prilosec over-the-counter 1 a day for 3 months to heal that.  Is always possible that this gastric erosion/ulcer could be from Crohn's although the pathology report is fairly nonspecific.       The pathology from the colonoscopy did show mild inflammation and the colon biopsies at 80 cm did show granuloma with mild active colitis.  I did discuss this case with the pathologist and she felt that he probably had Crohn's but that it was fairly mild.       I discussed with the patient whether he wanted to try some anti-Crohn's medicines?  He reminded me that back in 2019 we tried mesalamine and sulfasalazine and they did not really help.  I told him that we could always try budesonide for a month for example and just see if his symptoms get better or not.  Trying him on something like Humira would be an option if he would like.  Another option would be for him to go see Dr. Eduardo Jamil to get his opinion about all this?  The patient wants to discuss this with his wife and think about it.  I told him to get back in touch if I could be of help.       Dr. CARRASCO: GIBARN.  Thx. kjh

## 2024-05-29 ENCOUNTER — TELEPHONE (OUTPATIENT)
Dept: OBSTETRICS AND GYNECOLOGY | Facility: CLINIC | Age: 54
End: 2024-05-29
Payer: COMMERCIAL

## 2024-05-29 RX ORDER — CEPHALEXIN 500 MG/1
500 CAPSULE ORAL 3 TIMES DAILY
Qty: 30 CAPSULE | Refills: 0 | Status: SHIPPED | OUTPATIENT
Start: 2024-05-29 | End: 2024-06-08

## 2024-05-29 NOTE — TELEPHONE ENCOUNTER
Patient works in our office. Came to me with recent possible insect bite tor right arm with a large patch of erythema extending up towards shoulder. The area is warm to touch. Concerned for cellulitis. Sending antibiotics to pharmacy to treat.     Sandra Sandoval, APRN  5/29/24  9:02am  
[Negative] : Endocrine

## 2024-06-19 NOTE — PATIENT INSTRUCTIONS
Symptoms markedly improved in just a few days. Able to exercise without cough or shortness of breath. I feel since symptoms were mild and with symptoms resolution you are safe to go back to work next Tuesday.   If you develop new symptoms before Tuesday, please do not return to work and follow up for reevaluation.    [Negative] : Heme/Lymph

## 2024-10-02 ENCOUNTER — TELEPHONE (OUTPATIENT)
Dept: GASTROENTEROLOGY | Facility: CLINIC | Age: 54
End: 2024-10-02
Payer: COMMERCIAL

## 2024-10-02 NOTE — TELEPHONE ENCOUNTER
Called pt and advised of appt time on 10/04 at noon with Dr Friedman. Pt can make appt .       Update sent to Bonnie.

## 2024-10-04 ENCOUNTER — OFFICE VISIT (OUTPATIENT)
Dept: GASTROENTEROLOGY | Facility: CLINIC | Age: 54
End: 2024-10-04
Payer: COMMERCIAL

## 2024-10-04 ENCOUNTER — TELEPHONE (OUTPATIENT)
Dept: GASTROENTEROLOGY | Facility: CLINIC | Age: 54
End: 2024-10-04
Payer: COMMERCIAL

## 2024-10-04 VITALS
TEMPERATURE: 97.7 F | HEIGHT: 67 IN | WEIGHT: 143.6 LBS | DIASTOLIC BLOOD PRESSURE: 79 MMHG | HEART RATE: 49 BPM | SYSTOLIC BLOOD PRESSURE: 117 MMHG | BODY MASS INDEX: 22.54 KG/M2

## 2024-10-04 DIAGNOSIS — R19.7 DIARRHEA, UNSPECIFIED TYPE: ICD-10-CM

## 2024-10-04 DIAGNOSIS — K62.5 RECTAL BLEEDING: ICD-10-CM

## 2024-10-04 DIAGNOSIS — Z83.79 FH: CROHN'S DISEASE: Primary | ICD-10-CM

## 2024-10-04 DIAGNOSIS — K25.3 ACUTE GASTRIC ULCER WITHOUT HEMORRHAGE OR PERFORATION: ICD-10-CM

## 2024-10-04 NOTE — PROGRESS NOTES
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 I last saw him in 4/23 when I did an EGD (a gastric antral ulcer was found, path was nonspecific with no helicobacter pylori) and colonoscopy (a 3 mm rectosigmoid polyp was removed and he had internal hemorrhoids. Biopsies at 80 cms showed mild inflammation and granulomata). In 9/19 he had a colonoscopy because of rectal bleeding. Intermittent colon ulcers and erosions were found but path was nonspecific.        He c/o every fall he gets looser stools and abd pain. He has noticed some rectal bleeding on tissue. No anal pain. Stools on and off loose. He has 0-2 BM/day. Very infreq generalize abd pain, not common. No constipation. No nausea or vomting. No fevers, chills. Weight stable. No heartburn. Occas cough. Denies NSAIDs use. NOnsmoker. Occas ETOH.     Past Medical History:   Diagnosis Date   • Anal fissure    • Crohn's disease    • Family history of colon cancer    • GERD (gastroesophageal reflux disease)    • Internal hemorrhoids    • Seasonal allergies        Past Surgical History:   Procedure Laterality Date   • COLONOSCOPY N/A 09/06/2019    Anal fissure, IH, Intermittent areas of colonic erosions/ulcers with some normal looking intervening areas mucosa    • COLONOSCOPY N/A 4/14/2023    Procedure: COLONOSCOPY INTO CECUM & TERMINAL ILEUM WITH COLD BIOPSIES AND COLD BIOPSY POLYPECTOMY;  Surgeon: Edgardo Friedman MD;  Location: [facility] ENDOSCOPY;  Service: Gastroenterology;  Laterality: N/A;  PRE: DIARRHEA  POST: COLON POLYP; HEMORRHOIDS   • ENDOSCOPY N/A 4/14/2023    Procedure: ESOPHAGOGASTRODUODENOSCOPY WITH COLD BIOPSIES;  Surgeon: Edgardo Friedman MD;  Location: [facility] ENDOSCOPY;  Service: Gastroenterology;  Laterality: N/A;  PRE: REFLUX  POST: GASTRIC ULCER, GASTRITIS   • HAND OPEN REDUCTION INTERNAL FIXATION Right    •  UPPER GASTROINTESTINAL ENDOSCOPY  2019   • WISDOM TOOTH EXTRACTION           Current Outpatient Medications:   •  cetirizine (zyrTEC) 10 MG tablet, Take 1 tablet by mouth Daily., Disp: , Rfl:   •  omeprazole (priLOSEC) 20 MG capsule, Take 1 capsule by mouth 2 (two) times a day., Disp: , Rfl:     Allergies   Allergen Reactions   • Sulfa Antibiotics Rash       Family History   Problem Relation Age of Onset   • Crohn's disease Mother    • Colon cancer Maternal Uncle        Social History     Socioeconomic History   • Marital status:    Tobacco Use   • Smoking status: Never   • Smokeless tobacco: Never   Substance and Sexual Activity   • Alcohol use: Yes     Comment: social   • Drug use: No   • Sexual activity: Defer       Review of Systems   Gastrointestinal:  Positive for abdominal pain and diarrhea.   All other systems reviewed and are negative.    Pertinent positives and negatives documented in the HPI and all other systems reviewed and were found to be negative.  Vitals:    10/04/24 1155   BP: 117/79   Pulse: (!) 49   Temp: 97.7 °F (36.5 °C)       Physical Exam  Vitals reviewed.   Constitutional:       General: He is not in acute distress.     Appearance: Normal appearance. He is well-developed. He is not diaphoretic.   HENT:      Head: Normocephalic and atraumatic. Hair is normal.      Right Ear: Hearing, tympanic membrane, ear canal and external ear normal.      Left Ear: Hearing, tympanic membrane, ear canal and external ear normal.      Nose: Nose normal. No nasal deformity.      Mouth/Throat:      Mouth: Mucous membranes are moist. No oral lesions.      Pharynx: Uvula midline. No uvula swelling.   Eyes:      General: Lids are normal. No scleral icterus.        Right eye: No discharge.         Left eye: No discharge.      Extraocular Movements: Extraocular movements intact.      Right eye: Normal extraocular motion and no nystagmus.      Left eye: Normal extraocular motion and no nystagmus.       Conjunctiva/sclera: Conjunctivae normal.      Pupils: Pupils are equal, round, and reactive to light.   Neck:      Thyroid: No thyromegaly.      Vascular: No JVD.   Cardiovascular:      Rate and Rhythm: Normal rate and regular rhythm.      Pulses: Normal pulses.      Heart sounds: Normal heart sounds. No murmur heard.     No gallop.   Pulmonary:      Effort: Pulmonary effort is normal. No respiratory distress.      Breath sounds: Normal breath sounds. No wheezing or rales.   Chest:      Chest wall: No tenderness.   Abdominal:      General: Bowel sounds are normal. There is no distension.      Palpations: Abdomen is soft. There is no mass.      Tenderness: There is no abdominal tenderness. There is no guarding.      Hernia: No hernia is present.   Genitourinary:     Rectum: Normal. Guaiac result negative.   Musculoskeletal:         General: No tenderness or deformity. Normal range of motion.      Cervical back: Normal range of motion and neck supple.   Lymphadenopathy:      Cervical: No cervical adenopathy.   Skin:     General: Skin is warm and dry.      Findings: No rash.   Neurological:      Mental Status: He is alert and oriented to person, place, and time.      Cranial Nerves: No cranial nerve deficit.      Motor: No abnormal muscle tone.      Coordination: Coordination normal.      Deep Tendon Reflexes: Reflexes are normal and symmetric. Reflexes normal.   Psychiatric:         Mood and Affect: Mood normal.         Behavior: Behavior normal.         Thought Content: Thought content normal.         Judgment: Judgment normal.       Diagnoses and all orders for this visit:    1. FH: Crohn's disease (Primary)  -     CBC & Differential  -     Amylase  -     Lipase  -     Comprehensive Metabolic Panel  -     Celiac Ab tTG DGP TIgA  -     TSH  -     Case Request; Standing  -     Case Request  -     Case Request; Standing  -     Case Request  -     Case Request; Standing  -     Case Request    2. Rectal bleeding  -      CBC & Differential  -     Amylase  -     Lipase  -     Comprehensive Metabolic Panel  -     Celiac Ab tTG DGP TIgA  -     TSH  -     Case Request; Standing  -     Case Request  -     Case Request; Standing  -     Case Request  -     Case Request; Standing  -     Case Request    3. Diarrhea, unspecified type  -     CBC & Differential  -     Amylase  -     Lipase  -     Comprehensive Metabolic Panel  -     Celiac Ab tTG DGP TIgA  -     TSH  -     Case Request; Standing  -     Case Request  -     Case Request; Standing  -     Case Request  -     Case Request; Standing  -     Case Request    4. Acute gastric ulcer without hemorrhage or perforation  -     Case Request; Standing  -     Case Request  -     Case Request; Standing  -     Case Request    Other orders  -     Implement Anesthesia orders day of procedure.; Standing  -     Follow Anesthesia Guidelines / Protocol; Future  -     Verify bowel prep was successful; Standing  -     Give tap water enema if bowel prep was insufficient; Standing  -     Implement Anesthesia orders day of procedure.; Standing  -     Follow Anesthesia Guidelines / Protocol; Future  -     Verify bowel prep was successful; Standing  -     Give tap water enema if bowel prep was insufficient; Standing  -     Implement Anesthesia orders day of procedure.; Standing  -     Follow Anesthesia Guidelines / Protocol; Future  -     Verify bowel prep was successful; Standing  -     Give tap water enema if bowel prep was insufficient; Standing      Assessment:  FH (mom) crohn's disease  FH (mat uncle) colon cancer  Rectal bleeding  Loose stools..  H/o PUD while on NSAIDs      Recommendations:  Labs  C/s      No follow-ups on file.    Edgardo Friedman MD  10/4/2024

## 2024-10-04 NOTE — TELEPHONE ENCOUNTER
ASHLEY KHAN IN SCHEDULING PT SCHEDULED 11/15/2024 ARRIVING AT 7:00AM,ACRLOS /YAMILETH PREP INFO HANDED TO THE PT,OK FOR HUB TO RELAY

## 2024-10-05 LAB
ALBUMIN SERPL-MCNC: 4.6 G/DL (ref 3.5–5.2)
ALBUMIN/GLOB SERPL: 1.8 G/DL
ALP SERPL-CCNC: 61 U/L (ref 39–117)
ALT SERPL-CCNC: 15 U/L (ref 1–41)
AMYLASE SERPL-CCNC: 49 U/L (ref 28–100)
AST SERPL-CCNC: 20 U/L (ref 1–40)
BASOPHILS # BLD AUTO: 0.02 10*3/MM3 (ref 0–0.2)
BASOPHILS NFR BLD AUTO: 0.4 % (ref 0–1.5)
BILIRUB SERPL-MCNC: 0.5 MG/DL (ref 0–1.2)
BUN SERPL-MCNC: 14 MG/DL (ref 6–20)
BUN/CREAT SERPL: 16.9 (ref 7–25)
CALCIUM SERPL-MCNC: 9.5 MG/DL (ref 8.6–10.5)
CHLORIDE SERPL-SCNC: 101 MMOL/L (ref 98–107)
CO2 SERPL-SCNC: 27.1 MMOL/L (ref 22–29)
CREAT SERPL-MCNC: 0.83 MG/DL (ref 0.76–1.27)
EGFRCR SERPLBLD CKD-EPI 2021: 104.7 ML/MIN/1.73
EOSINOPHIL # BLD AUTO: 0.08 10*3/MM3 (ref 0–0.4)
EOSINOPHIL NFR BLD AUTO: 1.6 % (ref 0.3–6.2)
ERYTHROCYTE [DISTWIDTH] IN BLOOD BY AUTOMATED COUNT: 12.2 % (ref 12.3–15.4)
GLIADIN PEPTIDE IGA SER-ACNC: 14 UNITS (ref 0–19)
GLIADIN PEPTIDE IGG SER-ACNC: 4 UNITS (ref 0–19)
GLOBULIN SER CALC-MCNC: 2.5 GM/DL
GLUCOSE SERPL-MCNC: 81 MG/DL (ref 65–99)
HCT VFR BLD AUTO: 41.4 % (ref 37.5–51)
HGB BLD-MCNC: 13.6 G/DL (ref 13–17.7)
IGA SERPL-MCNC: 295 MG/DL (ref 90–386)
IMM GRANULOCYTES # BLD AUTO: 0.01 10*3/MM3 (ref 0–0.05)
IMM GRANULOCYTES NFR BLD AUTO: 0.2 % (ref 0–0.5)
LIPASE SERPL-CCNC: 40 U/L (ref 13–60)
LYMPHOCYTES # BLD AUTO: 1.53 10*3/MM3 (ref 0.7–3.1)
LYMPHOCYTES NFR BLD AUTO: 30.2 % (ref 19.6–45.3)
MCH RBC QN AUTO: 30.5 PG (ref 26.6–33)
MCHC RBC AUTO-ENTMCNC: 32.9 G/DL (ref 31.5–35.7)
MCV RBC AUTO: 92.8 FL (ref 79–97)
MONOCYTES # BLD AUTO: 0.43 10*3/MM3 (ref 0.1–0.9)
MONOCYTES NFR BLD AUTO: 8.5 % (ref 5–12)
NEUTROPHILS # BLD AUTO: 3 10*3/MM3 (ref 1.7–7)
NEUTROPHILS NFR BLD AUTO: 59.1 % (ref 42.7–76)
NRBC BLD AUTO-RTO: 0 /100 WBC (ref 0–0.2)
PLATELET # BLD AUTO: 232 10*3/MM3 (ref 140–450)
POTASSIUM SERPL-SCNC: 4.5 MMOL/L (ref 3.5–5.2)
PROT SERPL-MCNC: 7.1 G/DL (ref 6–8.5)
RBC # BLD AUTO: 4.46 10*6/MM3 (ref 4.14–5.8)
SODIUM SERPL-SCNC: 139 MMOL/L (ref 136–145)
TSH SERPL DL<=0.005 MIU/L-ACNC: 1.81 UIU/ML (ref 0.27–4.2)
TTG IGA SER-ACNC: <2 U/ML (ref 0–3)
TTG IGG SER-ACNC: 4 U/ML (ref 0–5)
WBC # BLD AUTO: 5.07 10*3/MM3 (ref 3.4–10.8)

## 2024-10-14 ENCOUNTER — TELEPHONE (OUTPATIENT)
Dept: GASTROENTEROLOGY | Facility: CLINIC | Age: 54
End: 2024-10-14
Payer: COMMERCIAL

## 2024-10-14 NOTE — TELEPHONE ENCOUNTER
It noted pt has viewed his results thru [employer].     Results sent to pcp thru [facility].

## 2024-10-14 NOTE — TELEPHONE ENCOUNTER
----- Message from Edgardo Friedman sent at 10/14/2024 10:25 AM EDT -----  10/14/24       Please tell Dr. Brooks that his labs look good.  We will see what the colonoscopy shows.  Please send a copy of this report to his PCP.  Gordonx. kjh

## 2024-10-14 NOTE — PROGRESS NOTES
10/14/24       Please tell Dr. Brooks that his labs look good.  We will see what the colonoscopy shows.  Please send a copy of this report to his PCP.  Jarrell glover

## 2024-10-15 ENCOUNTER — TELEPHONE (OUTPATIENT)
Dept: GASTROENTEROLOGY | Facility: CLINIC | Age: 54
End: 2024-10-15
Payer: COMMERCIAL

## 2024-10-15 NOTE — TELEPHONE ENCOUNTER
ASHLEY MALAVE IN SCHEDULING PT SCHEDULED 11/04/2024 ARRIVING AT 1200PM PT VERBALIZES STILL HAS INFORMATION SHEET AND WILL UPDATE,OK FOR HUB TO RELAY

## 2024-10-22 ENCOUNTER — CLINICAL SUPPORT (OUTPATIENT)
Dept: OBSTETRICS AND GYNECOLOGY | Facility: CLINIC | Age: 54
End: 2024-10-22
Payer: COMMERCIAL

## 2024-10-22 DIAGNOSIS — Z23 NEED FOR VACCINATION FOR H FLU TYPE B: Primary | ICD-10-CM

## 2024-11-04 ENCOUNTER — ANESTHESIA EVENT (OUTPATIENT)
Dept: GASTROENTEROLOGY | Facility: HOSPITAL | Age: 54
End: 2024-11-04
Payer: COMMERCIAL

## 2024-11-04 ENCOUNTER — HOSPITAL ENCOUNTER (OUTPATIENT)
Facility: HOSPITAL | Age: 54
Setting detail: HOSPITAL OUTPATIENT SURGERY
Discharge: HOME | End: 2024-11-04
Attending: INTERNAL MEDICINE | Admitting: INTERNAL MEDICINE
Payer: COMMERCIAL

## 2024-11-04 ENCOUNTER — ANESTHESIA (OUTPATIENT)
Dept: GASTROENTEROLOGY | Facility: HOSPITAL | Age: 54
End: 2024-11-04
Payer: COMMERCIAL

## 2024-11-04 VITALS
OXYGEN SATURATION: 99 % | WEIGHT: 140.8 LBS | BODY MASS INDEX: 22.63 KG/M2 | HEIGHT: 66 IN | HEART RATE: 43 BPM | RESPIRATION RATE: 16 BRPM | DIASTOLIC BLOOD PRESSURE: 79 MMHG | SYSTOLIC BLOOD PRESSURE: 108 MMHG

## 2024-11-04 DIAGNOSIS — Z83.79 FH: CROHN'S DISEASE: ICD-10-CM

## 2024-11-04 DIAGNOSIS — R19.7 DIARRHEA, UNSPECIFIED TYPE: ICD-10-CM

## 2024-11-04 DIAGNOSIS — K25.3 ACUTE GASTRIC ULCER WITHOUT HEMORRHAGE OR PERFORATION: ICD-10-CM

## 2024-11-04 DIAGNOSIS — K62.5 RECTAL BLEEDING: ICD-10-CM

## 2024-11-04 PROCEDURE — 25010000002 LIDOCAINE 2% SOLUTION

## 2024-11-04 PROCEDURE — 25810000003 LACTATED RINGERS PER 1000 ML: Performed by: INTERNAL MEDICINE

## 2024-11-04 PROCEDURE — 25010000002 PROPOFOL 1000 MG/100ML EMULSION

## 2024-11-04 PROCEDURE — 88305 TISSUE EXAM BY PATHOLOGIST: CPT | Performed by: INTERNAL MEDICINE

## 2024-11-04 PROCEDURE — 25010000002 PROPOFOL 200 MG/20ML EMULSION

## 2024-11-04 PROCEDURE — 45380 COLONOSCOPY AND BIOPSY: CPT | Performed by: INTERNAL MEDICINE

## 2024-11-04 PROCEDURE — 43239 EGD BIOPSY SINGLE/MULTIPLE: CPT | Performed by: INTERNAL MEDICINE

## 2024-11-04 RX ORDER — LIDOCAINE HYDROCHLORIDE 20 MG/ML
INJECTION, SOLUTION INFILTRATION; PERINEURAL AS NEEDED
Status: DISCONTINUED | OUTPATIENT
Start: 2024-11-04 | End: 2024-11-04 | Stop reason: SURG

## 2024-11-04 RX ORDER — PROPOFOL 10 MG/ML
INJECTION, EMULSION INTRAVENOUS AS NEEDED
Status: DISCONTINUED | OUTPATIENT
Start: 2024-11-04 | End: 2024-11-04 | Stop reason: SURG

## 2024-11-04 RX ORDER — SODIUM CHLORIDE 0.9 % (FLUSH) 0.9 %
10 SYRINGE (ML) INJECTION AS NEEDED
Status: DISCONTINUED | OUTPATIENT
Start: 2024-11-04 | End: 2024-11-04 | Stop reason: HOSPADM

## 2024-11-04 RX ORDER — PROPOFOL 10 MG/ML
INJECTION, EMULSION INTRAVENOUS CONTINUOUS PRN
Status: DISCONTINUED | OUTPATIENT
Start: 2024-11-04 | End: 2024-11-04 | Stop reason: SURG

## 2024-11-04 RX ORDER — SODIUM CHLORIDE 0.9 % (FLUSH) 0.9 %
10 SYRINGE (ML) INJECTION EVERY 12 HOURS SCHEDULED
Status: DISCONTINUED | OUTPATIENT
Start: 2024-11-04 | End: 2024-11-04 | Stop reason: HOSPADM

## 2024-11-04 RX ORDER — SODIUM CHLORIDE, SODIUM LACTATE, POTASSIUM CHLORIDE, CALCIUM CHLORIDE 600; 310; 30; 20 MG/100ML; MG/100ML; MG/100ML; MG/100ML
30 INJECTION, SOLUTION INTRAVENOUS CONTINUOUS PRN
Status: DISCONTINUED | OUTPATIENT
Start: 2024-11-04 | End: 2024-11-04 | Stop reason: HOSPADM

## 2024-11-04 RX ORDER — SODIUM CHLORIDE 9 MG/ML
40 INJECTION, SOLUTION INTRAVENOUS AS NEEDED
Status: DISCONTINUED | OUTPATIENT
Start: 2024-11-04 | End: 2024-11-04 | Stop reason: HOSPADM

## 2024-11-04 RX ADMIN — LIDOCAINE HYDROCHLORIDE 60 MG: 20 INJECTION, SOLUTION INFILTRATION; PERINEURAL at 13:09

## 2024-11-04 RX ADMIN — PROPOFOL INJECTABLE EMULSION 100 MG: 10 INJECTION, EMULSION INTRAVENOUS at 13:09

## 2024-11-04 RX ADMIN — SODIUM CHLORIDE, POTASSIUM CHLORIDE, SODIUM LACTATE AND CALCIUM CHLORIDE 30 ML/HR: 600; 310; 30; 20 INJECTION, SOLUTION INTRAVENOUS at 12:14

## 2024-11-04 RX ADMIN — PROPOFOL 160 MCG/KG/MIN: 10 INJECTION, EMULSION INTRAVENOUS at 13:10

## 2024-11-04 NOTE — ANESTHESIA PREPROCEDURE EVALUATION
Anesthesia Evaluation     Patient summary reviewed and Nursing notes reviewed                Airway   Mallampati: I  Dental      Pulmonary - negative pulmonary ROS   Cardiovascular - negative cardio ROS    Rhythm: regular        Neuro/Psych- negative ROS  GI/Hepatic/Renal/Endo - negative ROS   (+) GERD, PUD, GI bleeding     Musculoskeletal (-) negative ROS    Abdominal    Substance History - negative use  (+) alcohol use     OB/GYN negative ob/gyn ROS         Other                    Anesthesia Plan    ASA 1     MAC     (Runner SAinus yanira baseline)  intravenous induction     Anesthetic plan, risks, benefits, and alternatives have been provided, discussed and informed consent has been obtained with: patient and spouse/significant other.    CODE STATUS:

## 2024-11-04 NOTE — ANESTHESIA POSTPROCEDURE EVALUATION
Patient: Mark Brooks    Procedure Summary       Date: 11/04/24 Room / Location: [facility] ENDOSCOPY 4 /  [facility]    Anesthesia Start: 1301 Anesthesia Stop: 1350    Procedures:       ESOPHAGOGASTRODUODENOSCOPY (EGD) with cold bxs (Esophagus)      COLONOSCOPY to cecum and ti with cold bxs Diagnosis:       FH: Crohn's disease      Rectal bleeding      Diarrhea, unspecified type      Acute gastric ulcer without hemorrhage or perforation      (FH: Crohn's disease [Z83.79])      (Rectal bleeding [K62.5])      (Diarrhea, unspecified type [R19.7])      (Acute gastric ulcer without hemorrhage or perforation [K25.3])    Surgeons: Edgardo Friedman MD Provider: Jamie Santamaria MD    Anesthesia Type: MAC ASA Status: 1            Anesthesia Type: MAC    Vitals  Vitals Value Taken Time   BP     Temp     Pulse 46 11/04/24 1355   Resp     SpO2 95 % 11/04/24 1355   Vitals shown include unfiled device data.        Post Anesthesia Care and Evaluation    Patient location during evaluation: PACU  Patient participation: complete - patient participated  Level of consciousness: awake and alert  Pain management: adequate    Airway patency: patent  Anesthetic complications: No anesthetic complications    Cardiovascular status: acceptable  Respiratory status: acceptable  Hydration status: acceptable    Comments: --------------------            11/04/24               1206     --------------------   BP:       112/85     Pulse:    (!) 48     Resp:       19       SpO2:      97%      --------------------     No

## 2024-11-04 NOTE — DISCHARGE INSTRUCTIONS
For the next 24 hours patient needs to be with a responsible adult.    For 24 hours DO NOT drive, operate machinery, appliances, drink alcohol, make important decisions or sign legal documents.    Start with a light or bland diet and advance to regular diet as tolerated.    Follow recommendations on procedure report provided by your doctor.    Call Dr Friedman for problems [phone]    Problems may include but not limited to: large amounts of bleeding, trouble breathing, repeated vomiting, severe unrelieved pain, fever or chills.

## 2024-11-04 NOTE — H&P
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 I last saw him in 4/23 when I did an EGD (a gastric antral ulcer was found, path was nonspecific with no helicobacter pylori) and colonoscopy (a 3 mm rectosigmoid polyp was removed and he had internal hemorrhoids. Biopsies at 80 cms showed mild inflammation and granulomata). In 9/19 he had a colonoscopy because of rectal bleeding. Intermittent colon ulcers and erosions were found but path was nonspecific.        He c/o every fall he gets looser stools and abd pain. He has noticed some rectal bleeding on tissue. No anal pain. Stools on and off loose. He has 0-2 BM/day. Very infreq generalize abd pain, not common. No constipation. No nausea or vomting. No fevers, chills. Weight stable. No heartburn. Occas cough. Denies NSAIDs use. NOnsmoker. Occas ETOH.      Medical History        Past Medical History:   Diagnosis Date    Anal fissure      Crohn's disease      Family history of colon cancer      GERD (gastroesophageal reflux disease)      Internal hemorrhoids      Seasonal allergies              Surgical History         Past Surgical History:   Procedure Laterality Date    COLONOSCOPY N/A 09/06/2019     Anal fissure, IH, Intermittent areas of colonic erosions/ulcers with some normal looking intervening areas mucosa     COLONOSCOPY N/A 4/14/2023     Procedure: COLONOSCOPY INTO CECUM & TERMINAL ILEUM WITH COLD BIOPSIES AND COLD BIOPSY POLYPECTOMY;  Surgeon: Edgardo Friedman MD;  Location: [facility] ENDOSCOPY;  Service: Gastroenterology;  Laterality: N/A;  PRE: DIARRHEA  POST: COLON POLYP; HEMORRHOIDS    ENDOSCOPY N/A 4/14/2023     Procedure: ESOPHAGOGASTRODUODENOSCOPY WITH COLD BIOPSIES;  Surgeon: Edgardo Friedman MD;  Location: [facility] ENDOSCOPY;  Service: Gastroenterology;  Laterality: N/A;  PRE: REFLUX  POST: GASTRIC ULCER, GASTRITIS    HAND OPEN  REDUCTION INTERNAL FIXATION Right      UPPER GASTROINTESTINAL ENDOSCOPY   2019    WISDOM TOOTH EXTRACTION                  Current Medications      Current Outpatient Medications:     cetirizine (zyrTEC) 10 MG tablet, Take 1 tablet by mouth Daily., Disp: , Rfl:     omeprazole (priLOSEC) 20 MG capsule, Take 1 capsule by mouth 2 (two) times a day., Disp: , Rfl:         Allergies        Allergies   Allergen Reactions    Sulfa Antibiotics Rash                  Family History   Problem Relation Age of Onset    Crohn's disease Mother      Colon cancer Maternal Uncle           Social History   Social History            Socioeconomic History    Marital status:    Tobacco Use    Smoking status: Never    Smokeless tobacco: Never   Substance and Sexual Activity    Alcohol use: Yes       Comment: social    Drug use: No    Sexual activity: Defer            Review of Systems   Gastrointestinal:  Positive for abdominal pain and diarrhea.   All other systems reviewed and are negative.     Pertinent positives and negatives documented in the HPI and all other systems reviewed and were found to be negative.      Vitals:     10/04/24 1155   BP: 117/79   Pulse: (!) 49   Temp: 97.7 °F (36.5 °C)         Physical Exam  Vitals reviewed.   Constitutional:       General: He is not in acute distress.     Appearance: Normal appearance. He is well-developed. He is not diaphoretic.   HENT:      Head: Normocephalic and atraumatic. Hair is normal.      Right Ear: Hearing, tympanic membrane, ear canal and external ear normal.      Left Ear: Hearing, tympanic membrane, ear canal and external ear normal.      Nose: Nose normal. No nasal deformity.      Mouth/Throat:      Mouth: Mucous membranes are moist. No oral lesions.      Pharynx: Uvula midline. No uvula swelling.   Eyes:      General: Lids are normal. No scleral icterus.        Right eye: No discharge.         Left eye: No discharge.      Extraocular Movements: Extraocular movements  intact.      Right eye: Normal extraocular motion and no nystagmus.      Left eye: Normal extraocular motion and no nystagmus.      Conjunctiva/sclera: Conjunctivae normal.      Pupils: Pupils are equal, round, and reactive to light.   Neck:      Thyroid: No thyromegaly.      Vascular: No JVD.   Cardiovascular:      Rate and Rhythm: Normal rate and regular rhythm.      Pulses: Normal pulses.      Heart sounds: Normal heart sounds. No murmur heard.     No gallop.   Pulmonary:      Effort: Pulmonary effort is normal. No respiratory distress.      Breath sounds: Normal breath sounds. No wheezing or rales.   Chest:      Chest wall: No tenderness.   Abdominal:      General: Bowel sounds are normal. There is no distension.      Palpations: Abdomen is soft. There is no mass.      Tenderness: There is no abdominal tenderness. There is no guarding.      Hernia: No hernia is present.   Genitourinary:     Rectum: Normal. Guaiac result negative.   Musculoskeletal:         General: No tenderness or deformity. Normal range of motion.      Cervical back: Normal range of motion and neck supple.   Lymphadenopathy:      Cervical: No cervical adenopathy.   Skin:     General: Skin is warm and dry.      Findings: No rash.   Neurological:      Mental Status: He is alert and oriented to person, place, and time.      Cranial Nerves: No cranial nerve deficit.      Motor: No abnormal muscle tone.      Coordination: Coordination normal.      Deep Tendon Reflexes: Reflexes are normal and symmetric. Reflexes normal.   Psychiatric:         Mood and Affect: Mood normal.         Behavior: Behavior normal.         Thought Content: Thought content normal.         Judgment: Judgment normal.         Diagnoses and all orders for this visit:     1. FH: Crohn's disease (Primary)  -     CBC & Differential  -     Amylase  -     Lipase  -     Comprehensive Metabolic Panel  -     Celiac Ab tTG DGP TIgA  -     TSH  -     Case Request; Standing  -     Case  Request  -     Case Request; Standing  -     Case Request  -     Case Request; Standing  -     Case Request     2. Rectal bleeding  -     CBC & Differential  -     Amylase  -     Lipase  -     Comprehensive Metabolic Panel  -     Celiac Ab tTG DGP TIgA  -     TSH  -     Case Request; Standing  -     Case Request  -     Case Request; Standing  -     Case Request  -     Case Request; Standing  -     Case Request     3. Diarrhea, unspecified type  -     CBC & Differential  -     Amylase  -     Lipase  -     Comprehensive Metabolic Panel  -     Celiac Ab tTG DGP TIgA  -     TSH  -     Case Request; Standing  -     Case Request  -     Case Request; Standing  -     Case Request  -     Case Request; Standing  -     Case Request     4. Acute gastric ulcer without hemorrhage or perforation  -     Case Request; Standing  -     Case Request  -     Case Request; Standing  -     Case Request     Other orders  -     Implement Anesthesia orders day of procedure.; Standing  -     Follow Anesthesia Guidelines / Protocol; Future  -     Verify bowel prep was successful; Standing  -     Give tap water enema if bowel prep was insufficient; Standing  -     Implement Anesthesia orders day of procedure.; Standing  -     Follow Anesthesia Guidelines / Protocol; Future  -     Verify bowel prep was successful; Standing  -     Give tap water enema if bowel prep was insufficient; Standing  -     Implement Anesthesia orders day of procedure.; Standing  -     Follow Anesthesia Guidelines / Protocol; Future  -     Verify bowel prep was successful; Standing  -     Give tap water enema if bowel prep was insufficient; Standing        Assessment:  FH (mom) crohn's disease  FH (mat uncle) colon cancer  Rectal bleeding  Loose stools..  H/o PUD while on NSAIDs        Recommendations:  Labs  C/s        No follow-ups on file.     11/4/24 - No change from the above H and P.    Edgardo Friedman MD

## 2024-11-05 LAB
CYTO UR: NORMAL
LAB AP CASE REPORT: NORMAL
PATH REPORT.FINAL DX SPEC: NORMAL
PATH REPORT.GROSS SPEC: NORMAL

## 2024-11-21 ENCOUNTER — TRANSCRIBE ORDERS (OUTPATIENT)
Dept: ADMINISTRATIVE | Facility: HOSPITAL | Age: 54
End: 2024-11-21
Payer: COMMERCIAL

## 2024-11-21 DIAGNOSIS — Z13.6 SCREENING FOR CARDIOVASCULAR CONDITION: Primary | ICD-10-CM

## 2024-12-01 DIAGNOSIS — Z83.79 FH: CROHN'S DISEASE: Primary | ICD-10-CM

## 2024-12-01 DIAGNOSIS — R19.7 DIARRHEA, UNSPECIFIED TYPE: ICD-10-CM

## 2024-12-01 DIAGNOSIS — K52.9 COLITIS: ICD-10-CM

## 2025-02-24 ENCOUNTER — TRANSCRIBE ORDERS (OUTPATIENT)
Dept: ADMINISTRATIVE | Facility: HOSPITAL | Age: 55
End: 2025-02-24
Payer: COMMERCIAL

## 2025-02-24 ENCOUNTER — LAB (OUTPATIENT)
Dept: LAB | Facility: HOSPITAL | Age: 55
End: 2025-02-24
Payer: COMMERCIAL

## 2025-02-24 DIAGNOSIS — E78.5 HYPERLIPIDEMIA, UNSPECIFIED HYPERLIPIDEMIA TYPE: Primary | ICD-10-CM

## 2025-02-24 DIAGNOSIS — E78.5 HYPERLIPIDEMIA, UNSPECIFIED HYPERLIPIDEMIA TYPE: ICD-10-CM

## 2025-02-24 LAB
ALBUMIN SERPL-MCNC: 4.7 G/DL (ref 3.5–5.2)
ALP SERPL-CCNC: 60 U/L (ref 39–117)
ALT SERPL W P-5'-P-CCNC: 25 U/L (ref 1–41)
AST SERPL-CCNC: 38 U/L (ref 1–40)
BILIRUB CONJ SERPL-MCNC: 0.3 MG/DL (ref 0–0.3)
BILIRUB INDIRECT SERPL-MCNC: 0.4 MG/DL
BILIRUB SERPL-MCNC: 0.7 MG/DL (ref 0–1.2)
CHOLEST SERPL-MCNC: 122 MG/DL (ref 0–200)
HDLC SERPL-MCNC: 61 MG/DL (ref 40–60)
LDLC SERPL CALC-MCNC: 48 MG/DL (ref 0–100)
LDLC/HDLC SERPL: 0.81 {RATIO}
PROT SERPL-MCNC: 7.6 G/DL (ref 6–8.5)
TRIGL SERPL-MCNC: 59 MG/DL (ref 0–150)
VLDLC SERPL-MCNC: 13 MG/DL (ref 5–40)

## 2025-02-24 PROCEDURE — 80061 LIPID PANEL: CPT

## 2025-02-24 PROCEDURE — 36415 COLL VENOUS BLD VENIPUNCTURE: CPT

## 2025-02-24 PROCEDURE — 80076 HEPATIC FUNCTION PANEL: CPT

## 2025-03-03 NOTE — PROGRESS NOTES
Chief Complaint   Patient presents with    [facility]             History of Present Illness  History of Present Illness  The patient presents for evaluation of rectal bleeding.    In 2019, he experienced new-onset rectal bleeding, prompting a consultation with Dr. Ramey. A colonoscopy revealed internal hemorrhoids and inflammation suggestive of Crohn's disease, although this diagnosis was not confirmed. He reports intermittent loose stools, but these do not significantly impact his daily activities. Biopsies indicated elevated inflammatory markers. A subsequent colonoscopy in 2023 showed a normal colon, but biopsies still indicated inflammation. He has declined steroid treatment for the inflammation. His symptoms fluctuate, with periods of normal bowel movements and formed stools, and other times when he experiences urgency and loose stools after consuming coffee. He reports no anemia or extraintestinal symptoms. His weight remains stable, and he eats without postprandial pain. He has not undergone serological testing for Crohn's disease. His most recent consultation with Dr. Ramey was post-colonoscopy, during which he reported persistent but manageable symptoms. He has been advised to consult with a specialist in inflammatory bowel conditions. He has not had a draining perianal fistula or perianal abscess. He was prescribed Asacol, which did not alleviate his symptoms.    In 2023, he was diagnosed with a nonbleeding gastric ulcer that tested negative for H. pylori. He was treated with omeprazole and discontinued NSAIDs, which he had been using for aches and pains associated with frequent running.    He has been taking magnesium glycinate, which does not cause diarrhea or alter his bowel movements.    Supplemental Information  He had an anal fissure in 2019. A CT enterography in 2023 was normal.    FAMILY HISTORY  His mother had severe anemia due to Crohn's disease and was treated for it from her 40s  onward.    MEDICATIONS  Current: omeprazole, magnesium glycinate  Past: Asacol      He has history of anal fissure   FHx Crohn's disease (mother)  He is an OBGYN      Celiac Ab tTG DGP TIgA (10/04/2024 13:03) negative  Calprotectin, Fecal - Stool, Per Rectum (09/17/2019 07:45) 554 high    CT Enterography Abdomen Pelvis w wo Contrast (01/30/2023 11:09) negative    Upper GI Endoscopy (11/04/2024 13:02) gastritis  Colonoscopy (11/04/2024 12:58) nl  Tissue Pathology Exam (11/04/2024 13:14)  minimal focal active colitis  UPPER GI ENDOSCOPY (04/14/2023 07:27)  gastric ulcer (NSAID-induced)  COLONOSCOPY (04/14/2023 07:28)   Tissue Pathology Exam (04/14/2023 08:56) Mild focal active colitis with scattered granulomata and reactive epithelial changes   COLONOSCOPY (09/06/2019 14:34) anal fissure, Intermittent areas of colonic erosions/ulcers with some normal looking intervening areas mucosa  Tissue Pathology Exam (09/06/2019 14:45) focal active cryptitis         Past Medical History:   Diagnosis Date    Anal fissure     Crohn's disease     Family history of colon cancer     GERD (gastroesophageal reflux disease)     Internal hemorrhoids     Seasonal allergies      Past Surgical History:   Procedure Laterality Date    COLONOSCOPY N/A 09/06/2019    Anal fissure, IH, Intermittent areas of colonic erosions/ulcers with some normal looking intervening areas mucosa     COLONOSCOPY N/A 4/14/2023    Procedure: COLONOSCOPY INTO CECUM & TERMINAL ILEUM WITH COLD BIOPSIES AND COLD BIOPSY POLYPECTOMY;  Surgeon: Edgardo Friedman MD;  Location: [facility] ENDOSCOPY;  Service: Gastroenterology;  Laterality: N/A;  PRE: DIARRHEA  POST: COLON POLYP; HEMORRHOIDS    COLONOSCOPY N/A 11/4/2024    Procedure: COLONOSCOPY to cecum and ti with cold bxs;  Surgeon: Edgardo Friedman MD;  Location: [facility] ENDOSCOPY;  Service: Gastroenterology;  Laterality: N/A;  PRE: Rectal Bleeding, Diarrhea  POST: Internal Hemorrhoids, Occasional Erosions    ENDOSCOPY N/A 4/14/2023  "   Procedure: ESOPHAGOGASTRODUODENOSCOPY WITH COLD BIOPSIES;  Surgeon: Edgardo Friedman MD;  Location: [facility] ENDOSCOPY;  Service: Gastroenterology;  Laterality: N/A;  PRE: REFLUX  POST: GASTRIC ULCER, GASTRITIS    ENDOSCOPY N/A 11/4/2024    Procedure: ESOPHAGOGASTRODUODENOSCOPY (EGD) with cold bxs;  Surgeon: Edgardo Friedman MD;  Location: [facility] ENDOSCOPY;  Service: Gastroenterology;  Laterality: N/A;  PRE:  Dyspepsia, Hx of Gastric Ulcer  POST: Gastritis    HAND OPEN REDUCTION INTERNAL FIXATION Right     UPPER GASTROINTESTINAL ENDOSCOPY  2019    WISDOM TOOTH EXTRACTION       Social History     Socioeconomic History    Marital status:    Tobacco Use    Smoking status: Never    Smokeless tobacco: Never   Vaping Use    Vaping status: Never Used   Substance and Sexual Activity    Alcohol use: Yes     Comment: social    Drug use: No    Sexual activity: Defer       Review of Systems     Vital Signs:   /70   Pulse 56   Temp 96.4 °F (35.8 °C)   Ht 167.6 cm (65.98\")   Wt 63.9 kg (140 lb 14.4 oz)   SpO2 97%   BMI 22.75 kg/m²     Body mass index is 22.75 kg/m².    Physical Exam  Vitals reviewed.   Constitutional:       Appearance: Normal appearance.   HENT:      Nose: No nasal deformity.   Eyes:      General: No scleral icterus.  Neck:      Comments: Trachea midline.  Cardiovascular:      Rate and Rhythm: Normal rate and regular rhythm.   Pulmonary:      Effort: No respiratory distress.      Breath sounds: Normal breath sounds.   Abdominal:      General: Bowel sounds are normal. There is no distension.      Palpations: Abdomen is soft. There is no mass.      Tenderness: There is no abdominal tenderness.   Lymphadenopathy:      Comments: No periumbilical lymphadenopathy.   Skin:     General: Skin is warm.   Neurological:      Mental Status: He is alert.       Physical Exam            Results  Laboratory Studies  Inflammation markers were elevated. Hemoglobin was on the lower end of normal in 2019. Liver enzymes " were normal last month.    Imaging  Colonoscopy in 2019 showed internal hemorrhoids and inflammation. Upper and lower endoscopy in 2023 showed a nonbleeding gastric ulcer that was H. pylori negative and inflammation in the colon. CT enterography in 2023 was normal.    Testing  Biopsy from colonoscopy in 2019 showed features of Crohn's. Biopsy from upper and lower endoscopy in 2023 showed mild active focal colitis with scattered granuloma. Biopsy from 2024 showed minimal focal active colitis.       Assessment and Plan    Diagnoses and all orders for this visit:    1. FH: Crohn's disease (Primary)    2. Acute gastric ulcer without hemorrhage or perforation    3. Diarrhea, unspecified type    4. Lower abdominal pain    5. Rectal bleeding    6. Gastroesophageal reflux disease with esophagitis without hemorrhage       Assessment & Plan  1. Rectal bleeding.  The patient's symptoms, including intermittent loose stools and rectal bleeding, suggest a possible underlying inflammatory bowel disease (IBD). However, the severity of these symptoms does not warrant the initiation of steroid or biologic therapy at this time. The presence of granuloma on biopsy and intermittent inflammation under microscopy are indicative of IBD, but the absence of severe or debilitating symptoms suggests a potential overlap with irritable bowel syndrome (IBS), which could account for 30 to 40 percent of the symptomatology. A Prometheus test will be conducted to further investigate the possibility of Crohn's disease. Hyoscyamine will be prescribed for symptomatic relief of IBS-related symptoms, to be taken as needed. If the Prometheus test results are positive, a lower threshold for repeating endoscopies will be considered.    2. Nonbleeding gastric ulcer.  The patient had a nonbleeding gastric ulcer identified in 2023, which was H. pylori negative. He was previously treated with omeprazole and advised to stop taking NSAIDs. No new treatment is  required at this time as the ulcer is not currently symptomatic.    3. Medication management.  Hyoscyamine will be prescribed for symptomatic relief of IBS-related symptoms, to be taken as needed.    Follow-up  The patient will follow up in 6 months, provided his condition remains stable.    PROCEDURE  Colonoscopy in 2019 revealed internal hemorrhoids and inflammation. Upper and lower endoscopy in 2023 showed a nonbleeding gastric ulcer that was H. pylori negative and inflammation in the colon.          Follow Up   No follow-ups on file.    Patient or patient representative verbalized consent for the use of Ambient Listening during the visit with  Eduardo Jamil MD for chart documentation. 3/4/2025  15:16 EST    There are no Patient Instructions on file for this visit.

## 2025-03-04 ENCOUNTER — LAB (OUTPATIENT)
Dept: LAB | Facility: HOSPITAL | Age: 55
End: 2025-03-04
Payer: COMMERCIAL

## 2025-03-04 ENCOUNTER — OFFICE VISIT (OUTPATIENT)
Dept: GASTROENTEROLOGY | Facility: CLINIC | Age: 55
End: 2025-03-04
Payer: COMMERCIAL

## 2025-03-04 VITALS
TEMPERATURE: 96.4 F | OXYGEN SATURATION: 97 % | DIASTOLIC BLOOD PRESSURE: 70 MMHG | SYSTOLIC BLOOD PRESSURE: 110 MMHG | BODY MASS INDEX: 22.64 KG/M2 | HEIGHT: 66 IN | WEIGHT: 140.9 LBS | HEART RATE: 56 BPM

## 2025-03-04 DIAGNOSIS — K62.5 RECTAL BLEEDING: ICD-10-CM

## 2025-03-04 DIAGNOSIS — R19.7 DIARRHEA, UNSPECIFIED TYPE: ICD-10-CM

## 2025-03-04 DIAGNOSIS — Z83.79 FH: CROHN'S DISEASE: Primary | ICD-10-CM

## 2025-03-04 DIAGNOSIS — K21.00 GASTROESOPHAGEAL REFLUX DISEASE WITH ESOPHAGITIS WITHOUT HEMORRHAGE: ICD-10-CM

## 2025-03-04 DIAGNOSIS — K25.3 ACUTE GASTRIC ULCER WITHOUT HEMORRHAGE OR PERFORATION: ICD-10-CM

## 2025-03-04 DIAGNOSIS — R10.30 LOWER ABDOMINAL PAIN: ICD-10-CM

## 2025-03-04 DIAGNOSIS — Z83.79 FH: CROHN'S DISEASE: ICD-10-CM

## 2025-03-04 PROCEDURE — 83520 IMMUNOASSAY QUANT NOS NONAB: CPT | Performed by: NURSE PRACTITIONER

## 2025-03-04 PROCEDURE — 82397 CHEMILUMINESCENT ASSAY: CPT | Performed by: NURSE PRACTITIONER

## 2025-03-04 PROCEDURE — 86140 C-REACTIVE PROTEIN: CPT | Performed by: NURSE PRACTITIONER

## 2025-03-04 PROCEDURE — 81405 MOPATH PROCEDURE LEVEL 6: CPT | Performed by: NURSE PRACTITIONER

## 2025-03-04 PROCEDURE — 36415 COLL VENOUS BLD VENIPUNCTURE: CPT

## 2025-03-04 PROCEDURE — 81479 UNLISTED MOLECULAR PATHOLOGY: CPT | Performed by: NURSE PRACTITIONER

## 2025-03-04 PROCEDURE — 88350 IMFLUOR EA ADDL 1ANTB STN PX: CPT | Performed by: NURSE PRACTITIONER

## 2025-03-04 PROCEDURE — 88346 IMFLUOR 1ST 1ANTB STAIN PX: CPT | Performed by: NURSE PRACTITIONER

## 2025-03-04 RX ORDER — HYOSCYAMINE SULFATE 0.12 MG/1
0.12 TABLET SUBLINGUAL EVERY 6 HOURS PRN
Qty: 90 TABLET | Refills: 3 | Status: SHIPPED | OUTPATIENT
Start: 2025-03-04

## 2025-03-13 LAB — REF LAB TEST METHOD: NORMAL

## 2025-03-19 ENCOUNTER — RESULTS FOLLOW-UP (OUTPATIENT)
Dept: GASTROENTEROLOGY | Facility: CLINIC | Age: 55
End: 2025-03-19
Payer: COMMERCIAL

## 2025-05-28 ENCOUNTER — TRANSCRIBE ORDERS (OUTPATIENT)
Dept: ADMINISTRATIVE | Facility: HOSPITAL | Age: 55
End: 2025-05-28
Payer: COMMERCIAL

## 2025-05-28 ENCOUNTER — LAB (OUTPATIENT)
Dept: LAB | Facility: HOSPITAL | Age: 55
End: 2025-05-28
Payer: COMMERCIAL

## 2025-05-28 DIAGNOSIS — Z00.00 ROUTINE MEDICAL EXAM: ICD-10-CM

## 2025-05-28 DIAGNOSIS — Z12.5 SCREENING FOR PROSTATE CANCER: ICD-10-CM

## 2025-05-28 DIAGNOSIS — Z00.00 ROUTINE MEDICAL EXAM: Primary | ICD-10-CM

## 2025-05-28 LAB
ALBUMIN SERPL-MCNC: 4.2 G/DL (ref 3.5–5.2)
ALBUMIN/GLOB SERPL: 1.4 G/DL
ALP SERPL-CCNC: 63 U/L (ref 39–117)
ALT SERPL W P-5'-P-CCNC: 25 U/L (ref 1–41)
ANION GAP SERPL CALCULATED.3IONS-SCNC: 9 MMOL/L (ref 5–15)
AST SERPL-CCNC: 29 U/L (ref 1–40)
BASOPHILS # BLD AUTO: 0.02 10*3/MM3 (ref 0–0.2)
BASOPHILS NFR BLD AUTO: 0.4 % (ref 0–1.5)
BILIRUB SERPL-MCNC: 0.4 MG/DL (ref 0–1.2)
BILIRUB UR QL STRIP: NEGATIVE
BUN SERPL-MCNC: 16 MG/DL (ref 6–20)
BUN/CREAT SERPL: 16.8 (ref 7–25)
CALCIUM SPEC-SCNC: 8.9 MG/DL (ref 8.6–10.5)
CHLORIDE SERPL-SCNC: 102 MMOL/L (ref 98–107)
CHOLEST SERPL-MCNC: 125 MG/DL (ref 0–200)
CLARITY UR: CLEAR
CO2 SERPL-SCNC: 25 MMOL/L (ref 22–29)
COLOR UR: YELLOW
CREAT SERPL-MCNC: 0.95 MG/DL (ref 0.76–1.27)
DEPRECATED RDW RBC AUTO: 41.9 FL (ref 37–54)
EGFRCR SERPLBLD CKD-EPI 2021: 95.1 ML/MIN/1.73
EOSINOPHIL # BLD AUTO: 0.17 10*3/MM3 (ref 0–0.4)
EOSINOPHIL NFR BLD AUTO: 3.6 % (ref 0.3–6.2)
ERYTHROCYTE [DISTWIDTH] IN BLOOD BY AUTOMATED COUNT: 12 % (ref 12.3–15.4)
GLOBULIN UR ELPH-MCNC: 2.9 GM/DL
GLUCOSE SERPL-MCNC: 105 MG/DL (ref 65–99)
GLUCOSE UR STRIP-MCNC: NEGATIVE MG/DL
HCT VFR BLD AUTO: 42.5 % (ref 37.5–51)
HDLC SERPL-MCNC: 54 MG/DL (ref 40–60)
HGB BLD-MCNC: 13.9 G/DL (ref 13–17.7)
HGB UR QL STRIP.AUTO: NEGATIVE
IMM GRANULOCYTES # BLD AUTO: 0 10*3/MM3 (ref 0–0.05)
IMM GRANULOCYTES NFR BLD AUTO: 0 % (ref 0–0.5)
KETONES UR QL STRIP: ABNORMAL
LDLC SERPL CALC-MCNC: 57 MG/DL (ref 0–100)
LDLC/HDLC SERPL: 1.07 {RATIO}
LEUKOCYTE ESTERASE UR QL STRIP.AUTO: NEGATIVE
LYMPHOCYTES # BLD AUTO: 1.23 10*3/MM3 (ref 0.7–3.1)
LYMPHOCYTES NFR BLD AUTO: 26.2 % (ref 19.6–45.3)
MCH RBC QN AUTO: 30.8 PG (ref 26.6–33)
MCHC RBC AUTO-ENTMCNC: 32.7 G/DL (ref 31.5–35.7)
MCV RBC AUTO: 94.2 FL (ref 79–97)
MONOCYTES # BLD AUTO: 0.47 10*3/MM3 (ref 0.1–0.9)
MONOCYTES NFR BLD AUTO: 10 % (ref 5–12)
NEUTROPHILS NFR BLD AUTO: 2.8 10*3/MM3 (ref 1.7–7)
NEUTROPHILS NFR BLD AUTO: 59.8 % (ref 42.7–76)
NITRITE UR QL STRIP: NEGATIVE
NRBC BLD AUTO-RTO: 0 /100 WBC (ref 0–0.2)
PH UR STRIP.AUTO: 6 [PH] (ref 5–8)
PLATELET # BLD AUTO: 199 10*3/MM3 (ref 140–450)
PMV BLD AUTO: 11.2 FL (ref 6–12)
POTASSIUM SERPL-SCNC: 4.5 MMOL/L (ref 3.5–5.2)
PROT SERPL-MCNC: 7.1 G/DL (ref 6–8.5)
PROT UR QL STRIP: NEGATIVE
PSA SERPL-MCNC: 0.35 NG/ML (ref 0–4)
RBC # BLD AUTO: 4.51 10*6/MM3 (ref 4.14–5.8)
SODIUM SERPL-SCNC: 136 MMOL/L (ref 136–145)
SP GR UR STRIP: 1.02 (ref 1–1.03)
TRIGL SERPL-MCNC: 66 MG/DL (ref 0–150)
UROBILINOGEN UR QL STRIP: ABNORMAL
VLDLC SERPL-MCNC: 14 MG/DL (ref 5–40)
WBC NRBC COR # BLD AUTO: 4.69 10*3/MM3 (ref 3.4–10.8)

## 2025-05-28 PROCEDURE — 80053 COMPREHEN METABOLIC PANEL: CPT

## 2025-05-28 PROCEDURE — 36415 COLL VENOUS BLD VENIPUNCTURE: CPT

## 2025-05-28 PROCEDURE — 85025 COMPLETE CBC W/AUTO DIFF WBC: CPT

## 2025-05-28 PROCEDURE — G0103 PSA SCREENING: HCPCS

## 2025-05-28 PROCEDURE — 81003 URINALYSIS AUTO W/O SCOPE: CPT

## 2025-05-28 PROCEDURE — 80061 LIPID PANEL: CPT

## (undated) DEVICE — SENSR O2 OXIMAX FNGR A/ 18IN NONSTR

## (undated) DEVICE — CANN O2 ETCO2 FITS ALL CONN CO2 SMPL A/ 7IN DISP LF

## (undated) DEVICE — SINGLE-USE BIOPSY FORCEPS: Brand: RADIAL JAW 4

## (undated) DEVICE — LN SMPL CO2 SHTRM SD STREAM W/M LUER

## (undated) DEVICE — CANNULA,ADULT,SOFT-TOUCH,7'TUBE,UC: Brand: PENDING

## (undated) DEVICE — ADAPT CLN BIOGUARD AIR/H2O DISP

## (undated) DEVICE — TUBING, SUCTION, 1/4" X 10', STRAIGHT: Brand: MEDLINE

## (undated) DEVICE — BITEBLOCK OMNI BLOC

## (undated) DEVICE — BLCK/BITE BLOX W/DENTL/RIM W/STRAP 54F

## (undated) DEVICE — Device: Brand: DEFENDO AIR/WATER/SUCTION AND BIOPSY VALVE

## (undated) DEVICE — KT ORCA ORCAPOD DISP STRL

## (undated) DEVICE — THE TORRENT IRRIGATION SCOPE CONNECTOR IS USED WITH THE TORRENT IRRIGATION TUBING TO PROVIDE IRRIGATION FLUIDS SUCH AS STERILE WATER DURING GASTROINTESTINAL ENDOSCOPIC PROCEDURES WHEN USED IN CONJUNCTION WITH AN IRRIGATION PUMP (OR ELECTROSURGICAL UNIT).: Brand: TORRENT